# Patient Record
Sex: FEMALE | Race: WHITE | NOT HISPANIC OR LATINO | Employment: OTHER | URBAN - METROPOLITAN AREA
[De-identification: names, ages, dates, MRNs, and addresses within clinical notes are randomized per-mention and may not be internally consistent; named-entity substitution may affect disease eponyms.]

---

## 2023-03-28 PROBLEM — E66.3 OVERWEIGHT WITH BODY MASS INDEX (BMI) OF 29 TO 29.9 IN ADULT: Status: ACTIVE | Noted: 2023-03-28

## 2023-03-28 PROBLEM — M54.30 SCIATICA: Status: ACTIVE | Noted: 2023-03-28

## 2023-03-28 PROBLEM — G89.29 BACK PAIN, CHRONIC: Status: ACTIVE | Noted: 2023-03-28

## 2023-03-28 PROBLEM — I10 HYPERTENSION: Status: ACTIVE | Noted: 2023-03-28

## 2023-03-28 PROBLEM — Z96.642 STATUS POST LEFT HIP REPLACEMENT: Status: ACTIVE | Noted: 2023-03-28

## 2023-03-28 PROBLEM — E03.9 HYPOTHYROIDISM: Status: ACTIVE | Noted: 2023-03-28

## 2023-03-28 PROBLEM — E78.5 DYSLIPIDEMIA: Status: ACTIVE | Noted: 2023-03-28

## 2023-03-28 PROBLEM — R29.898 WEAKNESS OF RIGHT LOWER EXTREMITY: Status: ACTIVE | Noted: 2023-03-28

## 2023-03-28 PROBLEM — M81.0 OSTEOPOROSIS: Status: ACTIVE | Noted: 2023-03-28

## 2023-03-28 PROBLEM — R29.898 TRANSIENT LEFT LEG WEAKNESS: Status: ACTIVE | Noted: 2023-03-28

## 2023-03-28 PROBLEM — R42 VERTIGO: Status: ACTIVE | Noted: 2023-03-28

## 2023-03-28 PROBLEM — N39.0 URINARY TRACT INFECTION: Status: ACTIVE | Noted: 2023-03-28

## 2023-03-28 PROBLEM — H61.22 IMPACTED CERUMEN OF LEFT EAR: Status: ACTIVE | Noted: 2023-03-28

## 2023-03-28 PROBLEM — B00.1 RECURRENT COLD SORES: Status: ACTIVE | Noted: 2023-03-28

## 2023-03-28 PROBLEM — F43.9 SITUATIONAL STRESS: Status: ACTIVE | Noted: 2023-03-28

## 2023-03-28 PROBLEM — M54.9 BACK PAIN, CHRONIC: Status: ACTIVE | Noted: 2023-03-28

## 2023-03-28 PROBLEM — M19.90 ARTHRITIS: Status: ACTIVE | Noted: 2023-03-28

## 2023-03-28 PROBLEM — M48.061 SPINAL STENOSIS, LUMBAR: Status: ACTIVE | Noted: 2023-03-28

## 2023-03-28 PROBLEM — N39.0 RECURRENT UTI: Status: ACTIVE | Noted: 2023-03-28

## 2023-03-28 PROBLEM — N95.2 ATROPHIC VAGINITIS: Status: ACTIVE | Noted: 2023-03-28

## 2023-03-28 PROBLEM — M17.9 KNEE OSTEOARTHRITIS: Status: ACTIVE | Noted: 2023-03-28

## 2023-03-28 PROBLEM — G47.00 INSOMNIA, PERSISTENT: Status: ACTIVE | Noted: 2023-03-28

## 2023-03-28 PROBLEM — D12.6 BENIGN NEOPLASM OF LARGE INTESTINE: Status: ACTIVE | Noted: 2023-03-28

## 2023-03-28 PROBLEM — K21.9 ESOPHAGEAL REFLUX: Status: ACTIVE | Noted: 2023-03-28

## 2023-03-28 PROBLEM — M11.20 CHONDROCALCINOSIS: Status: ACTIVE | Noted: 2023-03-28

## 2023-03-28 RX ORDER — LEVOTHYROXINE SODIUM 112 UG/1
1 TABLET ORAL DAILY
COMMUNITY
Start: 2013-03-07 | End: 2023-07-03 | Stop reason: SDUPTHER

## 2023-03-28 RX ORDER — ESTRADIOL 0.1 MG/G
CREAM VAGINAL
COMMUNITY
Start: 2014-04-25 | End: 2023-12-13 | Stop reason: SDUPTHER

## 2023-03-28 RX ORDER — HYDROXYZINE HYDROCHLORIDE 10 MG/1
10-20 TABLET, FILM COATED ORAL DAILY PRN
COMMUNITY
Start: 2022-03-24 | End: 2023-03-29 | Stop reason: ALTCHOICE

## 2023-03-28 RX ORDER — LOSARTAN POTASSIUM AND HYDROCHLOROTHIAZIDE 12.5; 1 MG/1; MG/1
1 TABLET ORAL DAILY
COMMUNITY
Start: 2013-01-17 | End: 2023-04-14

## 2023-03-28 RX ORDER — AMLODIPINE BESYLATE 5 MG/1
1 TABLET ORAL DAILY
COMMUNITY
Start: 2020-12-28 | End: 2023-04-03

## 2023-03-28 RX ORDER — TIZANIDINE 4 MG/1
1 TABLET ORAL 2 TIMES DAILY
COMMUNITY
Start: 2021-11-29 | End: 2023-03-29 | Stop reason: ALTCHOICE

## 2023-03-28 RX ORDER — GABAPENTIN 300 MG/1
1 CAPSULE ORAL DAILY
COMMUNITY
Start: 2021-12-02 | End: 2023-03-29 | Stop reason: ALTCHOICE

## 2023-03-28 RX ORDER — VALACYCLOVIR HYDROCHLORIDE 1 G/1
2000 TABLET, FILM COATED ORAL
COMMUNITY
Start: 2020-02-20

## 2023-03-28 RX ORDER — OMEPRAZOLE 40 MG/1
1 CAPSULE, DELAYED RELEASE ORAL DAILY
COMMUNITY
Start: 2014-04-25 | End: 2024-04-03 | Stop reason: SDUPTHER

## 2023-03-28 RX ORDER — MIRABEGRON 50 MG/1
50 TABLET, EXTENDED RELEASE ORAL DAILY
COMMUNITY
Start: 2019-02-20 | End: 2023-04-19

## 2023-03-28 RX ORDER — TRAMADOL HYDROCHLORIDE 50 MG/1
1 TABLET ORAL DAILY PRN
COMMUNITY
Start: 2021-12-07 | End: 2023-03-29 | Stop reason: ALTCHOICE

## 2023-03-28 RX ORDER — ASPIRIN 81 MG/1
81 TABLET ORAL 3 TIMES WEEKLY
COMMUNITY
Start: 2020-12-28 | End: 2023-03-29 | Stop reason: ALTCHOICE

## 2023-03-28 NOTE — PROGRESS NOTES
Subjective   Patient ID: Jennifer Hdz is a 82 y.o. female who presents for evaluation of bilateral otalgia     Her younger sister was diagnosed with mouth cancer. She was a smoker, quit 10 years ago    She complains of her right ear feeling like there is water in it.   Her left ear has pain in it when pressure is applied.     She is having memory issues.   She has made plans with friends but forgets about the plans   She has not been driving and forgetting where she is going  She has no issues with leaving things running around the house   She admits she has remodeling projects going on at her house and has been distracted     She is not taking Tramadol any longer     She is no longer having constant pain in her hip  She has good surgical results   She is able to do stairs again     She saw Dr Rodriguez for back pain   She had a CT scan of L/S and nothing was found 2022  She had a bursa injection and it did not help 2022  She saw a different ortho physician and had injection to the bursa 12/2022 and doing so much better. She is able to bend to pick things off of the floor.   he was using long tongs to pick things up from the floor   She is back to walking 4 miles daily       HEALTH  PAP 7/05 and last one   Mammo 4/14 , 5/15 , 8/16, 12/18 at , 12/19 at , 9/1/2020-, 6/2022  BD 1/14 and T-2.7 and +2.4,1/17 T-2.2 and +2.2, 1/2020 T-1.8 hip, 6/2022 T-1.3  FRAX 1/2020 is 3.6%  Reclast 2012 last time   Colon + polyps and 2006, 5/2011, 12/17 +polyp so Q 1, ordered 5/2022   EKG 12/13 , 6/15 [facility] , 8/17 at , 12/18, 10/2020, 3/2021   Urine 12/14, 12/15 , 12/17, 6/18, 12/18, 12/19 at    Flu 2016 , 2017, 9/18, 12/19 , 10/2020, 9/2021, 11/2022  TDAP ? and will update with injury   Pneumovax 2012 last one   Prevnar 12/15, 1/16  Zostavax 2007  Shingrix 5/1/19 and 2/7/19   Moderna CVD vaccine 2/18/2021 and 3/11/2021 booster 12/4/2021  Ophth- She is seeing someone at Dr Andres's office, she is due for exam 2018. She goes  Q2 years for Rx check.        Review of Systems  All systems negative except those listed in the HPI      Objective   Visit Vitals  /78   Pulse 66   Temp 36.2 °C (97.2 °F)   Resp 16    Body mass index is 29.74 kg/m².     Physical Exam  Vitals reviewed.   Constitutional:       Appearance: Normal appearance.   HENT:      Head: Normocephalic.      Right Ear: Tympanic membrane, ear canal and external ear normal.      Left Ear: Tympanic membrane, ear canal and external ear normal.      Nose: Nose normal.      Mouth/Throat:      Pharynx: Oropharynx is clear.   Eyes:      Conjunctiva/sclera: Conjunctivae normal.   Cardiovascular:      Rate and Rhythm: Normal rate and regular rhythm.      Pulses: Normal pulses.      Heart sounds: Normal heart sounds.   Pulmonary:      Effort: Pulmonary effort is normal.      Breath sounds: Normal breath sounds.   Abdominal:      General: Bowel sounds are normal.      Palpations: Abdomen is soft.   Musculoskeletal:         General: Normal range of motion.      Cervical back: Normal range of motion.      Comments: TMJ tenderness left side   Skin:     General: Skin is warm.   Neurological:      General: No focal deficit present.      Mental Status: She is alert and oriented to person, place, and time.   Psychiatric:         Mood and Affect: Mood normal.         Behavior: Behavior normal.         Thought Content: Thought content normal.         Judgment: Judgment normal.       Assessment/Plan   Problem List Items Addressed This Visit       Dyslipidemia    Hypertension - Primary    Hypothyroidism    Osteoporosis    Situational stress    Status post left hip replacement        Follow up completed     She is an overall healthy 83 y/o female. Her younger sister was diagnosed with mouth cancer. She was a smoker, quit 10 years ago    Left sided TMJ: On exam: nothing noted in the ears bilaterally. She has TMJ tenderness left side 3/2023. Explained the pressure may be from the Eustachian tubes  not draining well. She can take an Aleve or Motrin prn pain   She complains of her right ear feeling like there is water in it.   Her left ear has pain in it when pressure is applied.   She follows with her dentist routinely      She has experienced tinnitus for years.  This is not problematic for her  She went to [facility] for hearing aids bilaterally    Her weight in office today is 146 with BMI of 29.74. We spent 15 minutes discussing diet and weight loss. The struggle of weight loss persists     HTN: Stable   Continue losartan/HCTZ 100/12.5 mg daily and amlodipine 5 mg daily.  EKG was normal except few PVC, no LVH or strain pattern noted 3/2021  Stress test 2017 was normal   She will continue to monitor her BP at home and will call with elevated readings     The patient's 10 yr CV risk was estimated at 38 %. We can decrease this to 18% with tighter control of her lipids 5/2022. I do not want to add a statin medication at this time     Elevated lipids:  and HDL 62 on labs in 6/2022  Explained goal for LDL to be less than 100 and ideally less than 70   Discussed diet and exercise for better control     Hypothyroid: TSH 0.99 on labs in 6/2022   Stable. Continue levothyroxine 112 mcg a day   She saw Endo but there were no recommendations that were new     Concerns for memory issues: Reassurance given to patient today 3/2023. I have not seen a decline in her memory. Explained when to be concerned for memory issues. She admits she has remodeling projects going on at her house and has been distracted   She is having memory issues. Explained being more mindful   She has made plans with friends but forgets about the plans   She has not been driving and forgetting where she is going  She has no issues with leaving things running around the house   She is keeping activities logged on a calendar and keeps a notebook close by   She reads a lot and does puzzle books.  She can try OTC memory supplements if she would like.      Situational Depression/ insomnia:   She is no longer seeing a therapist   Her  passed in 2022. She would have been  37 years in July 2022   Her stress got to a level that she disassociated when her  passed   Her son volunteered to do her finances for her and he is a math wizard so she let him.   Her KIRAN passed away from ALS in 8/18   She exercises to help relieve her stress.   She admits weight gain exacerbates her depressive Sx.   She likes to be in control of her environment   She could not tolerate Lexapro   Discontinue Remeron and Klonopin since it did not help.    Recurrent leg cramps:  She started eating potassium enriched foods and this has helped  She has good relief with yellow mustard prn    Bilateral varicose veins and spider veins right> left   Warned patient of S&S of phlebitis and she will call immediately if this is noted     Recurrent UTI- UTI 9/20/2021, 10/4/2021 and 10/12/2021.   Continue Macrobid 50 mg daily and D- Mannose daily   Continue Pyridium to use prn UTI.  Continue Estrace cream QOD and Myrbetriq 50 mg daily for OAB   Prescription sent in for Bactrim to have on hand and use prn UTI   CT urogram and cystoscopy in the past with Dr Mckenzie  She saw Dr Bush on 5/29/2020    Back pain:   She had nerve blocks on 1/21/2022 with Dr Espinoza.   She has tried many different modalities to control pain and they have all failed   MRI L/S 12/2021 showed severe spinal stenosis lower spine, worse on left side.   MRI of C/S 2/2022 showed multilevel degenerative changes of the cervical spine. Central canal stenosis is most pronounced at C5- 6. There is multilevel neuroforaminal narrowing due to facet and uncovertebral joint hypertrophy  Continue gabapentin 300 mg 1 tablet QHS and tizanidine prn.   She had back surgery on 3 vertebrae (L3,4, and L5) with Dr Rodriguez 4/21/2022.   She had a CT scan of L/S and nothing was found 2022  She had a bursa injection and it did not help 2022  She saw  a different ortho physician and had injection to the bursa 12/2022 and doing so much better.   She is no longer taking Tramadol     Left hip pain and Arthritis issues: S/p LTH anterior approach 10/5/2022. She is happy with her surgical outcome   She saw ortho 7/2022 and had a nerve block but it did not help   She is doing well after surgery and will continue following with ortho     Right shoulder complete reversal on 10/5/2020:   She saw Dr Meza for a second opinion and was told she fractured her acromion   She was told there is nothing more that can be done for her right shoulder   She completed PT and doing the exercises at home     Recurrent Cold sores:  She has cold sores because of stress. She states she has a history of cold sores.  She is using L-lysine and Herpecin for cold sores  Continue Valtrex 1 GM to take as directed with onset of cold sore     Vitamin D def: Levels 36 on labs in 6/2022  Recommend OTC Vitamin D 2000 UT daily     Dermatology:   She had BCC removed from her forehead on 2/11/2022  She had BCC removed on 2/18/2022    Pap no longer needed   Mammo at [facility] + gneet in 6/2022 was negative. Breast exam normal 5/2022     BD 6/2022 T-1.3. Continue OTC Ca 600 mg BID, OTC Vitamin D 2000 UT daily and eat 2 servings of calcium enriched foods daily. Discussed importance of weight bearing exercises.  FRAX score was only 14.   Reclast last injection in 12/17.    Colonoscopy and EGD with Dr Galdamez and the EGD had polyp and bx done and the colon + polyp   Colon orders placed 5/2022     Ophth:  She is seeing someone at Dr Andres's office, she was seen in 2019.   She goes Q 2 years for Rx check.   She will have her next eye exam results faxed to my office in order to update her medical records.     Her daughter Lizeth is her MPOA. Recommend she bring a copy of her LW and MPOA in office to have scanned in her chart 5/2022      Flu 2016, 2017, 9/18, 12/19 , 10/2020, 9/2021, 11/2022  TDAP ? and will update  with injury   Pneumovax 2012 last one   Prevnar 12/15, 1/16  Zostavax 2007  Shingrix 5/1/19 and 2/7/19   Moderna CVD vaccine 2/18/2021 and 3/11/2021 booster 12/4/2021    RTC in 5/2023 for MCR or sooner if needed      Scribe Attestation  By signing my name below, I, Amy Greer , Scribe   attest that this documentation has been prepared under the direction and in the presence of Darlene Azul MD.     0

## 2023-03-29 ENCOUNTER — OFFICE VISIT (OUTPATIENT)
Dept: PRIMARY CARE | Facility: CLINIC | Age: 83
End: 2023-03-29
Payer: MEDICARE

## 2023-03-29 VITALS
RESPIRATION RATE: 16 BRPM | SYSTOLIC BLOOD PRESSURE: 132 MMHG | OXYGEN SATURATION: 96 % | TEMPERATURE: 97.2 F | WEIGHT: 146 LBS | HEART RATE: 66 BPM | BODY MASS INDEX: 29.74 KG/M2 | DIASTOLIC BLOOD PRESSURE: 78 MMHG

## 2023-03-29 DIAGNOSIS — Z96.642 STATUS POST LEFT HIP REPLACEMENT: ICD-10-CM

## 2023-03-29 DIAGNOSIS — M81.0 AGE-RELATED OSTEOPOROSIS WITHOUT CURRENT PATHOLOGICAL FRACTURE: ICD-10-CM

## 2023-03-29 DIAGNOSIS — E78.5 DYSLIPIDEMIA: ICD-10-CM

## 2023-03-29 DIAGNOSIS — I10 PRIMARY HYPERTENSION: ICD-10-CM

## 2023-03-29 DIAGNOSIS — F43.9 SITUATIONAL STRESS: ICD-10-CM

## 2023-03-29 DIAGNOSIS — R41.3 MEMORY LOSS, SHORT TERM: Primary | ICD-10-CM

## 2023-03-29 DIAGNOSIS — E03.9 HYPOTHYROIDISM, UNSPECIFIED TYPE: ICD-10-CM

## 2023-03-29 DIAGNOSIS — S03.00XS TMJ (DISLOCATION OF TEMPOROMANDIBULAR JOINT), SEQUELA: ICD-10-CM

## 2023-03-29 PROBLEM — M54.9 BACK PAIN, CHRONIC: Status: RESOLVED | Noted: 2023-03-28 | Resolved: 2023-03-29

## 2023-03-29 PROBLEM — R29.898 WEAKNESS OF RIGHT LOWER EXTREMITY: Status: RESOLVED | Noted: 2023-03-28 | Resolved: 2023-03-29

## 2023-03-29 PROBLEM — G89.29 BACK PAIN, CHRONIC: Status: RESOLVED | Noted: 2023-03-28 | Resolved: 2023-03-29

## 2023-03-29 PROBLEM — R29.898 TRANSIENT LEFT LEG WEAKNESS: Status: RESOLVED | Noted: 2023-03-28 | Resolved: 2023-03-29

## 2023-03-29 PROBLEM — M54.30 SCIATICA: Status: RESOLVED | Noted: 2023-03-28 | Resolved: 2023-03-29

## 2023-03-29 PROCEDURE — 1159F MED LIST DOCD IN RCRD: CPT | Performed by: INTERNAL MEDICINE

## 2023-03-29 PROCEDURE — 99214 OFFICE O/P EST MOD 30 MIN: CPT | Performed by: INTERNAL MEDICINE

## 2023-03-29 PROCEDURE — 1036F TOBACCO NON-USER: CPT | Performed by: INTERNAL MEDICINE

## 2023-03-29 PROCEDURE — 1160F RVW MEDS BY RX/DR IN RCRD: CPT | Performed by: INTERNAL MEDICINE

## 2023-03-29 PROCEDURE — 3075F SYST BP GE 130 - 139MM HG: CPT | Performed by: INTERNAL MEDICINE

## 2023-03-29 PROCEDURE — 3078F DIAST BP <80 MM HG: CPT | Performed by: INTERNAL MEDICINE

## 2023-03-29 ASSESSMENT — PATIENT HEALTH QUESTIONNAIRE - PHQ9
1. LITTLE INTEREST OR PLEASURE IN DOING THINGS: NOT AT ALL
SUM OF ALL RESPONSES TO PHQ9 QUESTIONS 1 AND 2: 0
2. FEELING DOWN, DEPRESSED OR HOPELESS: NOT AT ALL

## 2023-03-29 ASSESSMENT — LIFESTYLE VARIABLES: HOW OFTEN DO YOU HAVE A DRINK CONTAINING ALCOHOL: 2-3 TIMES A WEEK

## 2023-04-01 DIAGNOSIS — I10 PRIMARY HYPERTENSION: Primary | ICD-10-CM

## 2023-04-03 RX ORDER — AMLODIPINE BESYLATE 5 MG/1
TABLET ORAL
Qty: 90 TABLET | Refills: 0 | Status: SHIPPED | OUTPATIENT
Start: 2023-04-03 | End: 2023-06-28

## 2023-04-14 DIAGNOSIS — I10 PRIMARY HYPERTENSION: Primary | ICD-10-CM

## 2023-04-14 RX ORDER — LOSARTAN POTASSIUM AND HYDROCHLOROTHIAZIDE 12.5; 1 MG/1; MG/1
TABLET ORAL
Qty: 90 TABLET | Refills: 0 | Status: SHIPPED | OUTPATIENT
Start: 2023-04-14 | End: 2023-07-13

## 2023-04-19 DIAGNOSIS — N39.0 RECURRENT UTI: Primary | ICD-10-CM

## 2023-04-19 RX ORDER — NITROFURANTOIN MACROCRYSTALS 50 MG/1
50 CAPSULE ORAL NIGHTLY
COMMUNITY
Start: 2022-09-27 | End: 2023-04-19 | Stop reason: SDUPTHER

## 2023-04-19 RX ORDER — NITROFURANTOIN MACROCRYSTALS 50 MG/1
50 CAPSULE ORAL NIGHTLY
Qty: 90 CAPSULE | Refills: 0 | Status: SHIPPED | OUTPATIENT
Start: 2023-04-19 | End: 2023-04-25 | Stop reason: ALTCHOICE

## 2023-04-19 RX ORDER — MIRABEGRON 50 MG/1
TABLET, FILM COATED, EXTENDED RELEASE ORAL
Qty: 90 TABLET | Refills: 0 | Status: SHIPPED | OUTPATIENT
Start: 2023-04-19 | End: 2023-07-24 | Stop reason: SDUPTHER

## 2023-04-21 ENCOUNTER — OFFICE VISIT (OUTPATIENT)
Dept: PRIMARY CARE | Facility: CLINIC | Age: 83
End: 2023-04-21
Payer: MEDICARE

## 2023-04-21 VITALS
BODY MASS INDEX: 30.15 KG/M2 | SYSTOLIC BLOOD PRESSURE: 128 MMHG | WEIGHT: 148 LBS | TEMPERATURE: 97.8 F | RESPIRATION RATE: 16 BRPM | DIASTOLIC BLOOD PRESSURE: 72 MMHG | OXYGEN SATURATION: 90 % | HEART RATE: 75 BPM

## 2023-04-21 DIAGNOSIS — R09.02 HYPOXIA: Primary | ICD-10-CM

## 2023-04-21 PROCEDURE — 3074F SYST BP LT 130 MM HG: CPT | Performed by: NURSE PRACTITIONER

## 2023-04-21 PROCEDURE — 3078F DIAST BP <80 MM HG: CPT | Performed by: NURSE PRACTITIONER

## 2023-04-21 PROCEDURE — 99214 OFFICE O/P EST MOD 30 MIN: CPT | Performed by: NURSE PRACTITIONER

## 2023-04-21 PROCEDURE — 1159F MED LIST DOCD IN RCRD: CPT | Performed by: NURSE PRACTITIONER

## 2023-04-21 PROCEDURE — 1160F RVW MEDS BY RX/DR IN RCRD: CPT | Performed by: NURSE PRACTITIONER

## 2023-04-21 PROCEDURE — 1036F TOBACCO NON-USER: CPT | Performed by: NURSE PRACTITIONER

## 2023-04-21 RX ORDER — NEBULIZER AND COMPRESSOR
1 EACH MISCELLANEOUS EVERY 6 HOURS PRN
Qty: 1 EACH | Refills: 0 | Status: SHIPPED | OUTPATIENT
Start: 2023-04-21 | End: 2023-05-21

## 2023-04-21 RX ORDER — ALBUTEROL SULFATE 0.83 MG/ML
2.5 SOLUTION RESPIRATORY (INHALATION) 4 TIMES DAILY PRN
Qty: 120 ML | Refills: 0 | Status: SHIPPED | OUTPATIENT
Start: 2023-04-21 | End: 2023-11-08 | Stop reason: SDUPTHER

## 2023-04-21 ASSESSMENT — ENCOUNTER SYMPTOMS
DIARRHEA: 0
NAUSEA: 0
FATIGUE: 1
COUGH: 1
WHEEZING: 1
RHINORRHEA: 0
CHILLS: 1
VOMITING: 0
SHORTNESS OF BREATH: 1
FEVER: 1
ABDOMINAL PAIN: 0

## 2023-04-21 NOTE — PROGRESS NOTES
Subjective   Patient ID: Jennifer Hdz is a 82 y.o. female who presents for Cough.    Patient has had a cough for the past week. It is getting worse. Patient had a fever of 101 today. Took aspirin and it helped with the fever. Patient says that her chest hurts. Patient is vaccinated against COVID with a booster (not the most recent). Patient says that she has never felt this sick. Pt did an at home COVID test and it was negative.     Review of Systems   Constitutional:  Positive for chills, fatigue and fever.   HENT:  Negative for rhinorrhea.    Respiratory:  Positive for cough, shortness of breath and wheezing.    Cardiovascular:  Positive for chest pain.   Gastrointestinal:  Negative for abdominal pain, diarrhea, nausea and vomiting.       Objective   /72 (BP Location: Left arm, Patient Position: Sitting, BP Cuff Size: Adult)   Pulse 75   Temp 36.6 °C (97.8 °F) (Temporal)   Resp 16   Wt 67.1 kg (148 lb)   BMI 30.15 kg/m²     Physical Exam  Vitals reviewed.   Constitutional:       General: She is not in acute distress.     Appearance: She is ill-appearing.   HENT:      Head: Atraumatic.   Eyes:      Conjunctiva/sclera: Conjunctivae normal.   Cardiovascular:      Rate and Rhythm: Normal rate and regular rhythm.      Heart sounds: Normal heart sounds. No murmur heard.  Pulmonary:      Effort: Pulmonary effort is normal.      Breath sounds: Wheezing present.      Comments: Pt with inspiratory and expiratory wheezing. Deep moist cough. Hurts patient to cough. No signs of respiratory distress.   Chest:      Chest wall: Tenderness present.   Skin:     General: Skin is warm and dry.   Neurological:      General: No focal deficit present.      Mental Status: She is alert.   Psychiatric:         Mood and Affect: Mood normal.         Behavior: Behavior normal.     Assessment/Plan   Problem List Items Addressed This Visit    None  Visit Diagnoses       Hypoxia    -  Primary    Relevant Medications     nebulizer and compressor device        Patient with hypoxia in the office. Her pulse ox is between 90-92%. Pt with a fever and chest discomfort with cough. There is a concern for pneumonia. Patient is here with her daughter Lizeth and they were advised that Jennifer Mckinney is to go to the ER at [facility] for further evaluation and STAT imaging. Will give patient a nebulizer machine to take home to help with cough/breathing after discharge. Patient and daughter declined the need for ambulance to transport her to ER. Called Zee in the ER at [facility] and gave report.     Dr. Freed aware and in agreement with plan of care to send patient to the ER.

## 2023-04-24 ENCOUNTER — DOCUMENTATION (OUTPATIENT)
Dept: CARE COORDINATION | Facility: CLINIC | Age: 83
End: 2023-04-24
Payer: MEDICARE

## 2023-04-24 NOTE — PROGRESS NOTES
Subjective   Patient ID: Jennifer Hdz is a 82 y.o. female who presents for Transition of care and hospital follow up, admitted on 4/21/2023 and discharged on 4/22/2023 and diagnosed with acquired pneumonia       The patient did not get sick until she returned from seeing her sister  Her sister was not sick and no sick contacts she is aware of   She thought this was her ending she was feeling so poorly   Her nose is very sore since the hospital stay   She still has hoarseness to her voice   She was able to eat yesterday. She is forcing herself to eat   Today she noticed she was able to ambulate faster without SOB   The nebulizer treatments make her feel shaky afterwards   She has been afebrile since being home   She went to an  prior to going to the hospital   She is still taking Abx       HEALTH  PAP 7/05 and last one   Mammo 4/14 , 5/15 , 8/16, 12/18 at , 12/19 at , 9/1/2020-, 6/2022  BD 1/14 and T-2.7 and +2.4,1/17 T-2.2 and +2.2, 1/2020 T-1.8 hip, 6/2022 T-1.3  FRAX 1/2020 is 3.6%  Reclast 2012 last time   Colon + polyps and 2006, 5/2011, 12/17 +polyp so Q 1, ordered 5/2022   EKG 12/13 , 6/15 [facility] , 8/17 at , 12/18, 10/2020, 3/2021, 4/2023 ED  Urine 12/14, 12/15 , 12/17, 6/18, 12/18, 12/19 at    Flu 2016 , 2017, 9/18, 12/19 , 10/2020, 9/2021, 11/2022  TDAP ? and will update with injury   Pneumovax 2012 last one   Prevnar 12/15, 1/16  Zostavax 2007  Shingrix 5/1/19 and 2/7/19   Moderna CVD vaccine 2/18/2021 and 3/11/2021 booster 12/4/2021  Ophth- She is seeing someone at Dr Andres's office, she is due for exam 2018. She goes Q2 years for Rx check.        Review of Systems  All systems negative except those listed in the HPI      Objective   Visit Vitals  /66   Pulse 71   Temp 36.4 °C (97.6 °F)   Resp 16    Body mass index is 30.15 kg/m².     Engagement  Call Start Time: 1105 (4/25/2023 11:18 AM)    Medications  Medications reviewed with patient/caregiver?: Yes (4/25/2023 11:18 AM)  Is the  patient having any side effects they believe may be caused by any medication additions or changes?: No (4/25/2023 11:18 AM)  Does the patient have all medications ordered at discharge?: Yes (4/25/2023 11:18 AM)  Care Management Interventions: No intervention needed (4/25/2023 11:18 AM)  Is the patient taking all medications as directed (includes completed medication regime)?: Yes (4/25/2023 11:18 AM)    Appointments  Does the patient have a primary care provider?: Yes (4/25/2023 11:18 AM)  Care Management Interventions: Verified appointment date/time/provider (4/25/2023 11:18 AM)  Has the patient kept scheduled appointments due by today?: Yes (4/25/2023 11:18 AM)    Self Management  What is the home health agency?: None Ordered (4/25/2023 11:18 AM)  What Durable Medical Equipment (DME) was ordered?: None ordered (4/25/2023 11:18 AM)    Patient Teaching  Does the patient have access to their discharge instructions?: Yes (4/25/2023 11:18 AM)  Care Management Interventions: Reviewed instructions with patient (4/25/2023 11:18 AM)  What is the patient's perception of their health status since discharge?: Improving (4/25/2023 11:18 AM)  Is the patient/caregiver able to teach back the hierarchy of who to call/visit for symptoms/problems? PCP, Specialist, Home Health nurse, Urgent Care, ED, 911: Yes (4/25/2023 11:18 AM)       Physical Exam  Vitals reviewed.   Constitutional:       Appearance: Normal appearance.   HENT:      Head: Normocephalic.      Right Ear: Tympanic membrane, ear canal and external ear normal.      Left Ear: Tympanic membrane, ear canal and external ear normal.      Nose:      Comments: right nares dry     Mouth/Throat:      Pharynx: Oropharynx is clear.   Eyes:      Conjunctiva/sclera: Conjunctivae normal.   Cardiovascular:      Rate and Rhythm: Normal rate and regular rhythm.      Pulses: Normal pulses.      Heart sounds: Normal heart sounds.   Pulmonary:      Comments: crackles and rhonchi in the  lower lobes bilaterally   Abdominal:      General: Bowel sounds are normal.      Palpations: Abdomen is soft.   Musculoskeletal:         General: Normal range of motion.      Cervical back: Normal range of motion.   Skin:     General: Skin is warm.   Neurological:      General: No focal deficit present.      Mental Status: She is alert and oriented to person, place, and time.   Psychiatric:         Mood and Affect: Mood normal.         Behavior: Behavior normal.         Thought Content: Thought content normal.         Judgment: Judgment normal.       Assessment/Plan           Transition of care and hospital follow up completed   Hospital notes reviewed and medication reconciliation performed with patient   Reviewed labs and imaging from the hospital 4/2023    Follow up completed   Labs ordered and she will have these drawn next week     The patient did not get sick until she returned from seeing her sister  Her sister was not sick and no sick contacts she is aware of   She thought this was her ending she was feeling so poorly   Her nose is very sore since the hospital stay   She still has hoarseness to her voice   She was able to eat yesterday. She is forcing herself to eat   - Recommend she eat smaller meals more frequently during the day   Today she noticed she was able to ambulate faster without SOB   The nebulizer treatments make her feel shaky afterwards   She has been afebrile since being home   - Recommend she drink warm/ tepid fluids and keep well hydrated   - She will complete the Abx she is currently taking   - Recommend using a humidifier in the home  - She can continue to walk around the house for exercise.   - No lifting, biking, running or using a treadmill.   - Recommend 6 weeks before returning to full activity. Explained importance of giving herself time to heal   - Recommend eating potassium enriched foods daily - food options discussed   - Avoid dairy      Admitted on 4/21/2023 and discharged on  4/22/2023 and diagnosed with acquired pneumonia bilaterally: On exam: right nares dry, she has crackles and rhonchi in the lower lobes 4/2023  Seen in the ED on 4/21/2023 for SOB.   Patient reports she has had fevers at home as well as progressively worsening shortness of breath and cough.  She has been coughing so much to the point where she has had episodes of posttussive emesis.   Interpretation of labs: CBC with a mildly elevated white count of 11.7. Hemoglobin 11.5.  Otherwise unremarkable.  D-dimer elevated to 1743.   CMP notable for a sodium of 128, potassium of 3.2 and a chloride of 94.   Troponin initially elevated at 21 with repeat at 17.  BNP normal at 107.   CT chest 4/2023 with evidence of consolidation concerning for bilateral pneumonia.    EKG 4/2023 SR rate of 87, left axis, HI interval 150, QTc 450. Frequent PACs.   Patient was started on antibiotics : Rocephin and azithromycin.   During her stay in the ER, patient began to desaturate below 90%, requiring 2 L   of oxygen via nasal cannula.    Patient does not have any oxygen requirement at home.   Prescription sent in for mupirocin cream to apply to the nares as directed   Recommend doing nebulizer treatment Q 6- 8 hours  She has to take deep breaths Q hour. She will stand and force her diaphragm to help her take a deep breath   Recommend and orders placed for CXR to have done in 4 weeks 4/2023  She will call if Sx persist or worsen     She has experienced tinnitus for years.  This is not problematic for her  She went to [facility] for hearing aids bilaterally     Her weight in office today is 148 with BMI of 30.15. We spent 15 minutes discussing diet and weight loss. The struggle of weight loss persists     HTN: Stable   Continue losartan/HCTZ 100/12.5 mg daily and amlodipine 5 mg daily.  EKG was normal except few PVC, no LVH or strain pattern noted 3/2021  Stress test 2017 was normal   She will continue to monitor her BP at home and will call with  elevated readings      The patient's 10 yr CV risk was estimated at 38 %. We can decrease this to 18% with tighter control of her lipids 5/2022. I do not want to add a statin medication at this time      Elevated lipids:  and HDL 62 on labs in 6/2022  Explained goal for LDL to be less than 100 and ideally less than 70   Discussed diet and exercise for better control      Hypothyroid: TSH 0.99 on labs in 6/2022   Stable. Continue levothyroxine 112 mcg a day   She saw Endo but there were no recommendations that were new     Left sided TMJ:   She follows with her dentist routinely      Concerns for memory issues:   She is having memory issues. Explained being more mindful   She has made plans with friends but forgets about the plans   She has not been driving and forgetting where she is going  She has no issues with leaving things running around the house   She is keeping activities logged on a calendar and keeps a notebook close by   She reads a lot and does puzzle books.  She can try OTC memory supplements if she would like.      Situational Depression/ insomnia:   She is no longer seeing a therapist   Her  passed in 2022. She would have been  37 years in July 2022   Her stress got to a level that she disassociated when her  passed   Her son is doing her finances for her   Her KIRAN passed away from [facility] in 8/18   She exercises to help relieve her stress.   She admits weight gain exacerbates her depressive Sx.   She likes to be in control of her environment   She could not tolerate Lexapro   Discontinue Remeron and Klonopin since it did not help.     Recurrent leg cramps:  She started eating potassium enriched foods and this has helped  She has good relief with yellow mustard prn     Bilateral varicose veins and spider veins right> left   Warned patient of S&S of phlebitis and she will call immediately if this is noted      Recurrent UTI- UTI 9/20/2021, 10/4/2021 and 10/12/2021.   Continue  Macrobid 50 mg daily and D- Mannose daily   Continue Pyridium to use prn UTI.  Continue Estrace cream QOD and Myrbetriq 50 mg daily for OAB   Prescription sent in for Bactrim to have on hand and use prn UTI   CT urogram and cystoscopy in the past with Dr Mckenzie  She saw Dr Bush on 5/29/2020     Back pain:   She had nerve blocks on 1/21/2022 with Dr Espinoza.   She has tried many different modalities to control pain and they have all failed   MRI L/S 12/2021 showed severe spinal stenosis lower spine, worse on left side.   MRI of C/S 2/2022 showed multilevel degenerative changes of the cervical spine. Central canal stenosis is most pronounced at C5- 6. There is multilevel neuroforaminal narrowing due to facet and uncovertebral joint hypertrophy  Continue gabapentin 300 mg 1 tablet QHS and tizanidine prn.   She had back surgery on 3 vertebrae (L3,4, and L5) with Dr Rodriguez 4/21/2022.   She had a CT scan of L/S and nothing was found 2022  She had a bursa injection and it did not help 2022  She saw a different ortho physician and had injection to the bursa 12/2022 and doing so much better.   She is no longer taking Tramadol      Left hip pain and Arthritis issues: S/p LTH anterior approach 10/5/2022. She is happy with her surgical outcome   She saw ortho 7/2022 and had a nerve block but it did not help   She is doing well after surgery and will continue following with ortho      Right shoulder complete reversal on 10/5/2020:   She saw Dr Meza for a second opinion and was told she fractured her acromion   She was told there is nothing more that can be done for her right shoulder   She completed PT and doing the exercises at home      Recurrent Cold sores:  She has cold sores because of stress. She states she has a history of cold sores.  She is using L-lysine and Herpecin for cold sores  Continue Valtrex 1 GM to take as directed with onset of cold sore      Vitamin D def: Levels 36 on labs in 6/2022  Recommend OTC Vitamin  D 2000 UT daily      Dermatology:   She had BCC removed from her forehead on 2/11/2022  She had BCC removed on 2/18/2022     Pap no longer needed   Mammo at [facility] + genet in 6/2022 was negative. Breast exam normal 5/2022      BD 6/2022 T-1.3. Continue OTC Ca 600 mg BID, OTC Vitamin D 2000 UT daily and eat 2 servings of calcium enriched foods daily. Discussed importance of weight bearing exercises.  FRAX score was only 14.   Reclast last injection in 12/17.     Colonoscopy and EGD with Dr Galdamez and the EGD had polyp and bx done and the colon + polyp   Colon orders placed 5/2022      Ophth:  She is seeing someone at Dr Andres's office, she was seen in 2019.   She goes Q 2 years for Rx check.   She will have her next eye exam results faxed to my office in order to update her medical records.      Her daughter Lizeth is her MPOA. Recommend she bring a copy of her LW and MPOA in office to have scanned in her chart 5/2022        Flu 2016, 2017, 9/18, 12/19 , 10/2020, 9/2021, 11/2022  TDAP ? and will update with injury   Pneumovax 2012 last one   Prevnar 12/15, 1/16  Zostavax 2007  Shingrix 5/1/19 and 2/7/19   Moderna CVD vaccine 2/18/2021 and 3/11/2021 booster 12/4/2021     Keep scheduled appt in 5/2023 for MCR or sooner if needed       Scribe Attestation  By signing my name below, I, Amy Greer , Scribe   attest that this documentation has been prepared under the direction and in the presence of Darlene Azul MD.

## 2023-04-25 ENCOUNTER — PATIENT OUTREACH (OUTPATIENT)
Dept: CARE COORDINATION | Facility: CLINIC | Age: 83
End: 2023-04-25

## 2023-04-25 ENCOUNTER — OFFICE VISIT (OUTPATIENT)
Dept: PRIMARY CARE | Facility: CLINIC | Age: 83
End: 2023-04-25
Payer: MEDICARE

## 2023-04-25 VITALS
TEMPERATURE: 97.6 F | SYSTOLIC BLOOD PRESSURE: 134 MMHG | RESPIRATION RATE: 16 BRPM | HEART RATE: 71 BPM | OXYGEN SATURATION: 95 % | BODY MASS INDEX: 30.15 KG/M2 | WEIGHT: 148 LBS | DIASTOLIC BLOOD PRESSURE: 66 MMHG

## 2023-04-25 DIAGNOSIS — J18.9 PNEUMONIA OF BOTH LOWER LOBES DUE TO INFECTIOUS ORGANISM: ICD-10-CM

## 2023-04-25 DIAGNOSIS — J34.89 NASAL SORE: Primary | ICD-10-CM

## 2023-04-25 DIAGNOSIS — J96.01 ACUTE RESPIRATORY FAILURE WITH HYPOXIA (MULTI): ICD-10-CM

## 2023-04-25 PROCEDURE — 3075F SYST BP GE 130 - 139MM HG: CPT | Performed by: INTERNAL MEDICINE

## 2023-04-25 PROCEDURE — 3078F DIAST BP <80 MM HG: CPT | Performed by: INTERNAL MEDICINE

## 2023-04-25 PROCEDURE — 99214 OFFICE O/P EST MOD 30 MIN: CPT | Performed by: INTERNAL MEDICINE

## 2023-04-25 PROCEDURE — 1036F TOBACCO NON-USER: CPT | Performed by: INTERNAL MEDICINE

## 2023-04-25 PROCEDURE — 1160F RVW MEDS BY RX/DR IN RCRD: CPT | Performed by: INTERNAL MEDICINE

## 2023-04-25 PROCEDURE — 1159F MED LIST DOCD IN RCRD: CPT | Performed by: INTERNAL MEDICINE

## 2023-04-25 RX ORDER — AMOXICILLIN AND CLAVULANATE POTASSIUM 875; 125 MG/1; MG/1
875 TABLET, FILM COATED ORAL EVERY 12 HOURS
COMMUNITY
Start: 2023-04-22 | End: 2023-04-26

## 2023-04-25 RX ORDER — MUPIROCIN 20 MG/G
OINTMENT TOPICAL 3 TIMES DAILY
Qty: 22 G | Refills: 0 | Status: SHIPPED | OUTPATIENT
Start: 2023-04-25 | End: 2023-05-05

## 2023-04-25 NOTE — PROGRESS NOTES
The patient is here today for a follow up to [facility] for pneumonia, the patient was discharged on 4/22/2023.

## 2023-04-27 ENCOUNTER — DOCUMENTATION (OUTPATIENT)
Dept: CARE COORDINATION | Facility: CLINIC | Age: 83
End: 2023-04-27
Payer: MEDICARE

## 2023-04-27 NOTE — PROGRESS NOTES
Jennifer Hdz  Program: [facility] Pneumonia Post-Discharge  MRN: [id number]  YOB: 1969    This patient had a RED on Wed, 26 Apr 2023 11:01:32 am EDT    Question(s) with flags that caused the Alert (up to last 5 values   recorded)    Question: Cough Progression     Date:     2023-04-26     Color:    red     Answers:  Red    Question: Are you coughing up anything that is green, yellow, bloody, or   smelly?     Date:     2023-04-26     Color:    red     Answers:  Yes    Question: Is your cough productive?     Date:     2023-04-26     Color:    yellow     Answers:  Yes

## 2023-04-27 NOTE — PROGRESS NOTES
This nurse called and spoke to patient due to a conversa chat escalation due to coughing up yellow sputum, pt. Was also outreached by nurse hotline as well, which patient was educated on pneumonia post hospitalization and antibiotic therapy.      Pt. Stated to [facility] RN ELVIN that she took her last antibiotic last night and she is feeling much better.  Coughing happens a little after she completes her nebulizer treatments but she is breathing more deeply and feels better after treatments.  Pt. States her eating has improved and she is happy with the follow up calls and care she has been receiving from .  Pt. Has no other needs at this time.  Conversa chats will continue.  HOWIE Veronica, RN.

## 2023-05-01 ENCOUNTER — LAB (OUTPATIENT)
Dept: LAB | Facility: LAB | Age: 83
End: 2023-05-01
Payer: MEDICARE

## 2023-05-01 DIAGNOSIS — J18.9 PNEUMONIA OF BOTH LOWER LOBES DUE TO INFECTIOUS ORGANISM: ICD-10-CM

## 2023-05-01 LAB
ALANINE AMINOTRANSFERASE (SGPT) (U/L) IN SER/PLAS: 29 U/L (ref 7–45)
ALBUMIN (G/DL) IN SER/PLAS: 3.6 G/DL (ref 3.4–5)
ALKALINE PHOSPHATASE (U/L) IN SER/PLAS: 70 U/L (ref 33–136)
ANION GAP IN SER/PLAS: 9 MMOL/L (ref 10–20)
ASPARTATE AMINOTRANSFERASE (SGOT) (U/L) IN SER/PLAS: 22 U/L (ref 9–39)
BASOPHILS (10*3/UL) IN BLOOD BY AUTOMATED COUNT: 0.06 X10E9/L (ref 0–0.1)
BASOPHILS/100 LEUKOCYTES IN BLOOD BY AUTOMATED COUNT: 0.6 % (ref 0–2)
BILIRUBIN TOTAL (MG/DL) IN SER/PLAS: 0.3 MG/DL (ref 0–1.2)
CALCIUM (MG/DL) IN SER/PLAS: 8.9 MG/DL (ref 8.6–10.3)
CARBON DIOXIDE, TOTAL (MMOL/L) IN SER/PLAS: 28 MMOL/L (ref 21–32)
CHLORIDE (MMOL/L) IN SER/PLAS: 101 MMOL/L (ref 98–107)
CREATININE (MG/DL) IN SER/PLAS: 0.99 MG/DL (ref 0.5–1.05)
EOSINOPHILS (10*3/UL) IN BLOOD BY AUTOMATED COUNT: 0.13 X10E9/L (ref 0–0.4)
EOSINOPHILS/100 LEUKOCYTES IN BLOOD BY AUTOMATED COUNT: 1.2 % (ref 0–6)
ERYTHROCYTE DISTRIBUTION WIDTH (RATIO) BY AUTOMATED COUNT: 15.9 % (ref 11.5–14.5)
ERYTHROCYTE MEAN CORPUSCULAR HEMOGLOBIN CONCENTRATION (G/DL) BY AUTOMATED: 31.9 G/DL (ref 32–36)
ERYTHROCYTE MEAN CORPUSCULAR VOLUME (FL) BY AUTOMATED COUNT: 91 FL (ref 80–100)
ERYTHROCYTES (10*6/UL) IN BLOOD BY AUTOMATED COUNT: 4 X10E12/L (ref 4–5.2)
GFR FEMALE: 57 ML/MIN/1.73M2
GLUCOSE (MG/DL) IN SER/PLAS: 96 MG/DL (ref 74–99)
HEMATOCRIT (%) IN BLOOD BY AUTOMATED COUNT: 36.4 % (ref 36–46)
HEMOGLOBIN (G/DL) IN BLOOD: 11.6 G/DL (ref 12–16)
IMMATURE GRANULOCYTES/100 LEUKOCYTES IN BLOOD BY AUTOMATED COUNT: 0.7 % (ref 0–0.9)
LEUKOCYTES (10*3/UL) IN BLOOD BY AUTOMATED COUNT: 10.9 X10E9/L (ref 4.4–11.3)
LYMPHOCYTES (10*3/UL) IN BLOOD BY AUTOMATED COUNT: 1.74 X10E9/L (ref 0.8–3)
LYMPHOCYTES/100 LEUKOCYTES IN BLOOD BY AUTOMATED COUNT: 16 % (ref 13–44)
MONOCYTES (10*3/UL) IN BLOOD BY AUTOMATED COUNT: 0.52 X10E9/L (ref 0.05–0.8)
MONOCYTES/100 LEUKOCYTES IN BLOOD BY AUTOMATED COUNT: 4.8 % (ref 2–10)
NEUTROPHILS (10*3/UL) IN BLOOD BY AUTOMATED COUNT: 8.32 X10E9/L (ref 1.6–5.5)
NEUTROPHILS/100 LEUKOCYTES IN BLOOD BY AUTOMATED COUNT: 76.7 % (ref 40–80)
NRBC (PER 100 WBCS) BY AUTOMATED COUNT: 0 /100 WBC (ref 0–0)
PLATELETS (10*3/UL) IN BLOOD AUTOMATED COUNT: 500 X10E9/L (ref 150–450)
POTASSIUM (MMOL/L) IN SER/PLAS: 4 MMOL/L (ref 3.5–5.3)
PROTEIN TOTAL: 7.5 G/DL (ref 6.4–8.2)
SODIUM (MMOL/L) IN SER/PLAS: 134 MMOL/L (ref 136–145)
UREA NITROGEN (MG/DL) IN SER/PLAS: 14 MG/DL (ref 6–23)

## 2023-05-01 PROCEDURE — 85025 COMPLETE CBC W/AUTO DIFF WBC: CPT

## 2023-05-01 PROCEDURE — 36415 COLL VENOUS BLD VENIPUNCTURE: CPT

## 2023-05-01 PROCEDURE — 80053 COMPREHEN METABOLIC PANEL: CPT

## 2023-05-02 ENCOUNTER — TELEPHONE (OUTPATIENT)
Dept: PRIMARY CARE | Facility: CLINIC | Age: 83
End: 2023-05-02
Payer: MEDICARE

## 2023-05-02 NOTE — TELEPHONE ENCOUNTER
May 9th 9:30 appointment needs to be changed  to any other time in the afternoon on that day or any other day that week, please call her. She has called 4 times and can not get to talk to anyone.

## 2023-05-08 NOTE — PROGRESS NOTES
Subjective   Reason for Visit: Jennifer Hdz is an 82 y.o. female here for her Subsequent Medicare Assessment          She is doing well. Her breathing has improved   She is walking 4 miles a day again     She is going to [place] for Memorial Day     She thinks she needs to make another appt with ortho for bursa injection   She has trouble getting on her hands and knees to scrub her floor   She has pain in her knee if she kneels on it   The pain she is having has been present since hip surgery      HEALTH  PAP 7/05 and last one   Mammo 4/14 , 5/15 , 8/16, 12/18, 12/19, 9/1/2020-, 6/2022, ordered 5/2023   BD 1/14 and T-2.7 and +2.4,1/17 T-2.2 and +2.2, 1/2020 T-1.8 hip, 6/2022 T-1.3  FRAX 1/2020 is 3.6%  Reclast 2012 last time   Colon + polyps and 2006, 5/2011, 12/17 +polyp so Q 1, ordered 5/2022   EKG 12/13 , 6/15, 8/17, 12/18, 10/2020, 3/2021, 4/2023 ED  Urine 12/14, 12/15 , 12/17, 6/18, 12/18, 12/19 at    Flu 2016 , 2017, 9/18, 12/19 , 10/2020, 9/2021, 11/2022  TDAP ? and will update with injury   Pneumovax 2012 last one   Prevnar 12/15, 1/16  Zostavax 2007  Shingrix 5/1/19 and 2/7/19   Moderna CVD 2/18/2021 and 3/11/2021 booster 12/4/2021  Ophth- She is seeing someone at Dr Andres's office, she is due for exam 2018. She goes Q2 years       Patient Care Team:  Darlene Azul MD as PCP - General  Darlene Azul MD as PCP - [employer]age PCP  Sada Taylor, GARRETT as Care Manager (Case Management)     Review of Systems  All systems negative except those listed in the HPI      Past Medical, Surgical, and Family History reviewed and updated in chart.  Reviewed all medications by prescribing practitioner or clinical pharmacist   (such as prescriptions, OTCs, herbal therapies and supplements) and documented in the medical record.    Objective   Vitals:  Visit Vitals  /76 (BP Location: Left arm, Patient Position: Sitting, BP Cuff Size: Adult)   Pulse 90   Temp 35.9 °C (96.6 °F) (Skin)    Body mass  index is 29.29 kg/m².     Physical Exam  Vitals reviewed.   Constitutional:       Appearance: Normal appearance.   HENT:      Head: Normocephalic.      Right Ear: Tympanic membrane, ear canal and external ear normal.      Left Ear: Tympanic membrane, ear canal and external ear normal.      Nose: Nose normal.      Mouth/Throat:      Pharynx: Oropharynx is clear.   Eyes:      Conjunctiva/sclera: Conjunctivae normal.   Cardiovascular:      Rate and Rhythm: Normal rate and regular rhythm.      Pulses: Normal pulses.      Heart sounds: Normal heart sounds.   Pulmonary:      Effort: Pulmonary effort is normal.      Breath sounds: Normal breath sounds.   Abdominal:      General: Bowel sounds are normal.      Palpations: Abdomen is soft.   Musculoskeletal:         General: Normal range of motion.      Cervical back: Normal range of motion and neck supple.      Comments: tenderness left bursa    Skin:     General: Skin is warm.   Neurological:      General: No focal deficit present.      Mental Status: She is alert and oriented to person, place, and time.   Psychiatric:         Mood and Affect: Mood normal.         Behavior: Behavior normal.         Thought Content: Thought content normal.         Judgment: Judgment normal.       Assessment/Plan   Problem List Items Addressed This Visit       Hypertension     Other Visit Diagnoses       Visit for screening mammogram    -  Primary    Relevant Orders    BI mammo bilateral screening tomosynthesis           Subsequent Medicare Assessment completed   Hospital notes reviewed and medication reconciliation performed with patient   Reviewed her labs from 4/2023 and 5/2023     Medicare Wellness completed  -  Discussed healthy diet and regular exercise.    -  Physical exam overall unremarkable. Immunizations reviewed and updated accordingly. Healthy lifestyle choices discussed (tobacco avoidance, appropriate alcohol use, avoidance of illicit substances).   -  Patient is wearing  seatbelt.   -  Screening lab work ordered as indicated.    -  Age appropriate screening tests reviewed with patient.        We spent 15 minutes discussing depression screen and there is nothing found that is of concern for underling depression. The PQH form was filled and the meds reviewed. No depression to report     We spent 15 minutes discussing alcohol use and there are no concerns about overuse. The 15 min was spent in going over any issues of use of alcohol. None     She has grab bars in the shower.  She has not fallen recently and no risk of falls in the house   She has good lighting around the house and functioning smoke detectors.     Admitted on 4/21/2023 and discharged on 4/22/2023 and diagnosed with acquired pneumonia bilaterally: pulmonary exam normal 5/2023   Seen in the ED on 4/21/2023 for SOB.   CBC with a mildly elevated white count of 11.7. Hemoglobin 11.5.  Otherwise unremarkable.  D-dimer elevated to 1743.   CMP notable for a sodium of 128, potassium of 3.2 and a chloride of 94.   Troponin initially elevated at 21 with repeat at 17.  BNP normal at 107.   CT chest 4/2023 with evidence of consolidation concerning for bilateral pneumonia.    EKG 4/2023 SR rate of 87, left axis, IL interval 150, QTc 450. Frequent PACs.   Patient was started on antibiotics : Rocephin and azithromycin.   Recommend and orders placed for CXR to have done in 4 weeks 4/2023  She will call if Sx persist or worsen      She has experienced tinnitus for years.  This is not problematic for her  She went [facility] aids bilaterally     Her weight in office today is 145 with BMI of 29.29. We spent 15 minutes discussing diet and weight loss. The struggle of weight loss persists  Explained goal for BMI to be 25 or less       HTN: Stable   Continue losartan/HCTZ 100/12.5 mg daily and amlodipine 5 mg daily.  EKG 4/2023 SR rate of 87, left axis, IL interval 150, QTc 450. Frequent PACs.   Stress test 2017 was normal    Continue to monitor BP at home and call with elevated readings      I have spent 15 min face to face with this patient discussing their cardiac risk and behavioral therapies of nutrition choices and exercise. We are trying to eliminate habits that are contributing to their cardiac risk.  We agreed on a plan of how they can reduce their current CV risk   The patient's 10 yr CV risk was estimated at 38 %. We can decrease this to 18% with tighter control of her lipids 5/2022. I do not want to add a statin medication at this time      Elevated lipids:  and HDL 62 on labs in 6/2022  Explained goal for LDL to be less than 100 and ideally less than 70   Discussed diet and exercise for better control      Hypothyroid: TSH 0.99 on labs in 6/2022   Stable. Continue levothyroxine 112 mcg a day   She saw Endo but there were no recommendations that were new      Left sided TMJ:   She follows with her dentist routinely      Concerns for memory issues: Explained being more mindful    She is having memory issues.   She has made plans with friends but forgets about the plans   She has not been driving and forgetting where she is going  She has no issues with leaving things running around the house   She is keeping activities logged on a calendar and keeps a notebook close by   She reads a lot and does puzzle books.  She can try OTC memory supplements if she would like.      Situational Depression/ insomnia:   She is no longer seeing a therapist   Her  passed in 2022.   She would have been  37 years in July 2022   Her stress got to a level that she disassociated when her  passed   Her son is doing her finances for her   Her KIRAN passed away from ALS in 8/18   She exercises to help relieve her stress.   She admits weight gain exacerbates her depressive Sx.   She likes to be in control of her environment   She could not tolerate Lexapro   Discontinued Remeron and Klonopin since it did not help.      Recurrent leg cramps:  She started eating potassium enriched foods and this has helped  She has good relief with yellow mustard prn     Bilateral varicose veins and spider veins right> left   Warned patient of S&S of phlebitis and she will call immediately if this is noted      Recurrent UTI- UTI 9/20/2021, 10/4/2021 and 10/12/2021.   Continue Macrobid 50 mg daily and D- Mannose daily   Continue Pyridium to use prn UTI.  Continue Estrace cream QOD and Myrbetriq 50 mg daily for OAB   CT urogram and cystoscopy in the past with Dr Mckenzie  She saw Dr Bush on 5/29/2020     Back pain:   She had nerve blocks on 1/21/2022 with Dr Espinoza.   She has tried many different modalities to control pain and they have all failed   MRI L/S 12/2021 showed severe spinal stenosis lower spine, worse on left side.   MRI of C/S 2/2022 showed multilevel degenerative changes of the cervical spine. Central canal stenosis is most pronounced at C5- 6. There is multilevel neuroforaminal narrowing due to facet and uncovertebral joint hypertrophy  Continue gabapentin 300 mg 1 tablet QHS and tizanidine prn.   She had back surgery on 3 vertebrae (L3,4, and L5) with Dr Rodriguez 4/21/2022.   She had a CT scan of L/S and nothing was found 2022  She is no longer taking Tramadol      Left hip /arthritis issues: The pain she is having has been present since hip surgery. On exam: tenderness left bursa 5/2023   She saw ortho 7/2022 and had a nerve block but it did not help   S/p LTH anterior approach 10/5/2022.   She had a bursa injection and it did not help 2022  She saw a different ortho physician and had injection to the bursa 12/2022   She is going to call ortho for possible bursa injection 5/2023      Right shoulder complete reversal on 10/5/2020:   She saw Dr Meza for a second opinion and told she fractured her acromion   She was told there is nothing more that can be done for her right shoulder   She completed PT and doing the exercises at home       Recurrent Cold sores:  She has cold sores because of stress. She states she has a history of cold sores.  She is using L-lysine and Herpecin for cold sores  Continue Valtrex 1 GM to take as directed with onset of cold sore      Vitamin D def: Levels 36 on labs in 6/2022  Recommend OTC Vitamin D 2000 UT daily      Dermatology: she has multiple senile keratosis, nothing suspicious on exam 5/2023  She had BCC removed from her forehead on 2/11/2022  She had BCC removed on 2/18/2022  Continue following with dermatology      Pap no longer needed   Mammo at [facility] + genet in 6/2022 was negative and ordered 5/2023.   Breast exam normal 5/2023     BD 6/2022 T-1.3. Continue OTC Ca 600 mg BID, OTC Vitamin D 2000 UT daily and eat 2 servings of calcium enriched foods daily. Discussed importance of weight bearing exercises. FRAX score was only 14.   Reclast last injection in 12/17.     Colonoscopy 12/17 +polyp and orders placed 5/2022 but not done      Ophth:  She is seeing someone at Dr Andres's office, she was seen in 2019.   She goes Q 2 years for Rx check.   She will have her next eye exam results faxed to my office in order to update her medical records.      I spent 15 min with the patient discussing their wishes for end of life choices.   We discussed the need for a Living Will and that wishes should be discussed with Family. The DNR status was reviewed, and we discussed the options of this and, the DNR _CC options as well.   We also went over how important it was to have these choices written down and clear for any surviving family so that their wishes are followed   The patient and I came to to following agreement : Her daughter Lizeth is her MPOA. Recommend she bring a copy of her Advanced Directives in office to have scanned in her chart 5/2023        Flu 2016, 2017, 9/18, 12/19 , 10/2020, 9/2021, 11/2022  TDAP ? and will update with injury   Pneumovax 2012 last one   Prevnar 12/15, 1/16  Zostavax 2007  Shingrix 5/1/19  and 2/7/19   Moderna CVD vaccine 2/18/2021 and 3/11/2021 booster 12/4/2021     Return in 6 months for follow up or sooner if needed    (MCR due 5/2024)      Scribe Attestation  By signing my name below, I, Amy Greer , Scribalexander   attest that this documentation has been prepared under the direction and in the presence of Darlene Azul MD.

## 2023-05-09 ENCOUNTER — OFFICE VISIT (OUTPATIENT)
Dept: PRIMARY CARE | Facility: CLINIC | Age: 83
End: 2023-05-09
Payer: MEDICARE

## 2023-05-09 ENCOUNTER — APPOINTMENT (OUTPATIENT)
Dept: PRIMARY CARE | Facility: CLINIC | Age: 83
End: 2023-05-09
Payer: MEDICARE

## 2023-05-09 VITALS
BODY MASS INDEX: 29.23 KG/M2 | SYSTOLIC BLOOD PRESSURE: 134 MMHG | HEIGHT: 59 IN | OXYGEN SATURATION: 95 % | HEART RATE: 90 BPM | TEMPERATURE: 96.6 F | WEIGHT: 145 LBS | DIASTOLIC BLOOD PRESSURE: 68 MMHG

## 2023-05-09 DIAGNOSIS — E03.9 HYPOTHYROIDISM, UNSPECIFIED TYPE: ICD-10-CM

## 2023-05-09 DIAGNOSIS — M48.061 SPINAL STENOSIS OF LUMBAR REGION WITHOUT NEUROGENIC CLAUDICATION: ICD-10-CM

## 2023-05-09 DIAGNOSIS — I10 PRIMARY HYPERTENSION: ICD-10-CM

## 2023-05-09 DIAGNOSIS — Z00.00 HEALTHCARE MAINTENANCE: Primary | ICD-10-CM

## 2023-05-09 DIAGNOSIS — M70.62 TROCHANTERIC BURSITIS OF LEFT HIP: ICD-10-CM

## 2023-05-09 DIAGNOSIS — E78.5 DYSLIPIDEMIA: ICD-10-CM

## 2023-05-09 DIAGNOSIS — E66.3 OVERWEIGHT WITH BODY MASS INDEX (BMI) OF 29 TO 29.9 IN ADULT: ICD-10-CM

## 2023-05-09 DIAGNOSIS — M19.90 ARTHRITIS: ICD-10-CM

## 2023-05-09 DIAGNOSIS — Z96.642 STATUS POST LEFT HIP REPLACEMENT: ICD-10-CM

## 2023-05-09 DIAGNOSIS — Z12.31 VISIT FOR SCREENING MAMMOGRAM: ICD-10-CM

## 2023-05-09 PROBLEM — G47.00 INSOMNIA, PERSISTENT: Status: RESOLVED | Noted: 2023-03-28 | Resolved: 2023-05-09

## 2023-05-09 PROBLEM — J96.01 ACUTE RESPIRATORY FAILURE WITH HYPOXIA (MULTI): Status: RESOLVED | Noted: 2023-04-25 | Resolved: 2023-05-09

## 2023-05-09 PROCEDURE — G0442 ANNUAL ALCOHOL SCREEN 15 MIN: HCPCS | Performed by: INTERNAL MEDICINE

## 2023-05-09 PROCEDURE — 1159F MED LIST DOCD IN RCRD: CPT | Performed by: INTERNAL MEDICINE

## 2023-05-09 PROCEDURE — 1036F TOBACCO NON-USER: CPT | Performed by: INTERNAL MEDICINE

## 2023-05-09 PROCEDURE — 1170F FXNL STATUS ASSESSED: CPT | Performed by: INTERNAL MEDICINE

## 2023-05-09 PROCEDURE — 99397 PER PM REEVAL EST PAT 65+ YR: CPT | Performed by: INTERNAL MEDICINE

## 2023-05-09 PROCEDURE — G0439 PPPS, SUBSEQ VISIT: HCPCS | Performed by: INTERNAL MEDICINE

## 2023-05-09 PROCEDURE — 3075F SYST BP GE 130 - 139MM HG: CPT | Performed by: INTERNAL MEDICINE

## 2023-05-09 PROCEDURE — G0444 DEPRESSION SCREEN ANNUAL: HCPCS | Performed by: INTERNAL MEDICINE

## 2023-05-09 PROCEDURE — 1160F RVW MEDS BY RX/DR IN RCRD: CPT | Performed by: INTERNAL MEDICINE

## 2023-05-09 PROCEDURE — 3078F DIAST BP <80 MM HG: CPT | Performed by: INTERNAL MEDICINE

## 2023-05-09 PROCEDURE — G0446 INTENS BEHAVE THER CARDIO DX: HCPCS | Performed by: INTERNAL MEDICINE

## 2023-05-09 PROCEDURE — 99214 OFFICE O/P EST MOD 30 MIN: CPT | Performed by: INTERNAL MEDICINE

## 2023-05-09 ASSESSMENT — PATIENT HEALTH QUESTIONNAIRE - PHQ9
SUM OF ALL RESPONSES TO PHQ9 QUESTIONS 1 AND 2: 0
1. LITTLE INTEREST OR PLEASURE IN DOING THINGS: NOT AT ALL
2. FEELING DOWN, DEPRESSED OR HOPELESS: NOT AT ALL

## 2023-05-09 ASSESSMENT — ACTIVITIES OF DAILY LIVING (ADL)
BATHING: INDEPENDENT
DRESSING: INDEPENDENT
GROCERY_SHOPPING: INDEPENDENT
DOING_HOUSEWORK: INDEPENDENT
TAKING_MEDICATION: INDEPENDENT
MANAGING_FINANCES: INDEPENDENT

## 2023-05-09 ASSESSMENT — ENCOUNTER SYMPTOMS
LOSS OF SENSATION IN FEET: 0
OCCASIONAL FEELINGS OF UNSTEADINESS: 0
DEPRESSION: 0

## 2023-05-15 ENCOUNTER — DOCUMENTATION (OUTPATIENT)
Dept: CARE COORDINATION | Facility: CLINIC | Age: 83
End: 2023-05-15
Payer: MEDICARE

## 2023-05-15 NOTE — PROGRESS NOTES
Jennifer Mckinney Wilder  Program: [facility] Post-Discharge  MRN: [id number]  YOB: 1969    This patient had a YELLOW on Sat, 13 May 2023 9:11:33 am EDT    Question(s) with flags that caused the Alert (up to last 5 values   recorded)    Question: Cough Progression     Date:     2023-05-13     Color:    yellow     Answers:  Yellow       Date:     2023-04-26     Color:    red     Answers:  Red    Question: Are you coughing up anything that is green, yellow, bloody, or   smelly?     Date:     2023-05-13     Color:    yellow     Answers:  No       Date:     2023-04-26     Color:    red     Answers:  Yes    Question: Is your cough productive?     Date:     2023-05-13     Color:    yellow     Answers:  Yes       Date:     2023-04-26     Color:    yellow     Answers:  Yes

## 2023-05-15 NOTE — PROGRESS NOTES
"Patient with moderate risk escalation to [facility] [facility].  Patient states she still has a cough but it is \"much better.\"  Patient states it's a productive cough.  Patient states that the phlegm is no longer yellow it's clear now.  Patient thanked  for outreach call.  Patient with no questions at this time.  NRB  "

## 2023-05-22 ENCOUNTER — DOCUMENTATION (OUTPATIENT)
Dept: CARE COORDINATION | Facility: CLINIC | Age: 83
End: 2023-05-22
Payer: MEDICARE

## 2023-05-22 NOTE — PROGRESS NOTES
CM called and spoke with patient states she is home and doing extremely well.  Pt. States sputum has been clear and she has been feeling amazing.   Pt. Unsure of the responses she states she answered that way a while ago, but currently she is doing great.  Pt. Has no questions or concerns. HOWIE Veronica, RN.        Jennifer Mckinney Wilder  Program: [facility]neumonia Post-Discharge  MRN: [id number]  YOB: 1969    This patient had a YELLOW on Sat, 20 May 2023 5:09:04 pm EDT    Question(s) with flags that caused the Alert (up to last 5 values   recorded)    Question: Cough Progression     Date:     2023-05-20     Color:    yellow     Answers:  Yellow       Date:     2023-05-13     Color:    yellow     Answers:  Yellow       Date:     2023-04-26     Color:    red     Answers:  Red    Question: Are you coughing up anything that is green, yellow, bloody, or   smelly?     Date:     2023-05-20     Color:    yellow     Answers:  No       Date:     2023-05-13     Color:    yellow     Answers:  No       Date:     2023-04-26     Color:    red     Answers:  Yes    Question: Is your cough productive?     Date:     2023-05-20     Color:    yellow     Answers:  Yes       Date:     2023-05-13     Color:    yellow     Answers:  Yes       Date:     2023-04-26     Color:    yellow     Answers:  Yes

## 2023-06-15 ENCOUNTER — TELEPHONE (OUTPATIENT)
Dept: PRIMARY CARE | Facility: CLINIC | Age: 83
End: 2023-06-15
Payer: MEDICARE

## 2023-06-15 DIAGNOSIS — N39.0 RECURRENT UTI: ICD-10-CM

## 2023-06-15 DIAGNOSIS — M48.062 SPINAL STENOSIS OF LUMBAR REGION WITH NEUROGENIC CLAUDICATION: Primary | ICD-10-CM

## 2023-06-15 RX ORDER — NITROFURANTOIN 25; 75 MG/1; MG/1
100 CAPSULE ORAL 2 TIMES DAILY
Qty: 14 CAPSULE | Refills: 1 | Status: SHIPPED | OUTPATIENT
Start: 2023-06-15 | End: 2023-06-22

## 2023-06-15 NOTE — TELEPHONE ENCOUNTER
Patient states she has another UTI. Symptoms are burning upon urination / urgency for the last couple days. States she gets uti's frequently.     Would like to know if you could order a urine test and possible antibiotic.     Uses [facility] pharmacy in [place].     Please advise

## 2023-06-28 DIAGNOSIS — I10 PRIMARY HYPERTENSION: ICD-10-CM

## 2023-06-28 RX ORDER — AMLODIPINE BESYLATE 5 MG/1
TABLET ORAL
Qty: 90 TABLET | Refills: 0 | Status: SHIPPED | OUTPATIENT
Start: 2023-06-28 | End: 2023-10-19

## 2023-07-03 DIAGNOSIS — E03.9 HYPOTHYROIDISM, UNSPECIFIED TYPE: Primary | ICD-10-CM

## 2023-07-03 RX ORDER — LEVOTHYROXINE SODIUM 112 UG/1
112 TABLET ORAL DAILY
Qty: 6 TABLET | Refills: 0 | Status: SHIPPED | OUTPATIENT
Start: 2023-07-03 | End: 2023-11-06

## 2023-07-10 ENCOUNTER — LAB (OUTPATIENT)
Dept: LAB | Facility: LAB | Age: 83
End: 2023-07-10
Payer: MEDICARE

## 2023-07-10 DIAGNOSIS — N39.0 RECURRENT UTI: ICD-10-CM

## 2023-07-10 LAB
APPEARANCE, URINE: CLEAR
BILIRUBIN, URINE: NEGATIVE
BLOOD, URINE: NEGATIVE
COLOR, URINE: YELLOW
GLUCOSE, URINE: NEGATIVE MG/DL
KETONES, URINE: NEGATIVE MG/DL
LEUKOCYTE ESTERASE, URINE: ABNORMAL
NITRITE, URINE: NEGATIVE
PH, URINE: 6 (ref 5–8)
PROTEIN, URINE: NEGATIVE MG/DL
RBC, URINE: NORMAL /HPF (ref 0–5)
SPECIFIC GRAVITY, URINE: 1.01 (ref 1–1.03)
SQUAMOUS EPITHELIAL CELLS, URINE: 1 /HPF
UROBILINOGEN, URINE: <2 MG/DL (ref 0–1.9)
WBC, URINE: 1 /HPF (ref 0–5)

## 2023-07-10 PROCEDURE — 81001 URINALYSIS AUTO W/SCOPE: CPT

## 2023-07-13 DIAGNOSIS — I10 PRIMARY HYPERTENSION: ICD-10-CM

## 2023-07-13 RX ORDER — LOSARTAN POTASSIUM AND HYDROCHLOROTHIAZIDE 12.5; 1 MG/1; MG/1
TABLET ORAL
Qty: 90 TABLET | Refills: 3 | Status: SHIPPED | OUTPATIENT
Start: 2023-07-13 | End: 2024-04-03 | Stop reason: SDUPTHER

## 2023-07-14 ENCOUNTER — TELEPHONE (OUTPATIENT)
Dept: PRIMARY CARE | Facility: CLINIC | Age: 83
End: 2023-07-14
Payer: MEDICARE

## 2023-07-14 NOTE — TELEPHONE ENCOUNTER
Patient has another UTI, incontinence, pain while urinating, burning. Patient states her symptoms started this AM. Patient has her sister's  tomorrow. Needs something asap. Please advise at home number when prescription is called in to pharmacy.

## 2023-07-15 LAB — URINE CULTURE: NORMAL

## 2023-07-19 DIAGNOSIS — N39.0 RECURRENT UTI: Primary | ICD-10-CM

## 2023-07-19 RX ORDER — NITROFURANTOIN 25; 75 MG/1; MG/1
100 CAPSULE ORAL 2 TIMES DAILY
Qty: 10 CAPSULE | Refills: 2 | Status: SHIPPED | OUTPATIENT
Start: 2023-07-19 | End: 2023-07-24 | Stop reason: SDUPTHER

## 2023-07-24 DIAGNOSIS — N39.0 RECURRENT UTI: ICD-10-CM

## 2023-07-24 PROBLEM — R09.02 HYPOXIA: Status: ACTIVE | Noted: 2023-07-24

## 2023-07-24 PROBLEM — E87.6 HYPOKALEMIA: Status: ACTIVE | Noted: 2023-07-24

## 2023-07-24 PROBLEM — M25.9 DISORDER OF SHOULDER: Status: ACTIVE | Noted: 2023-07-24

## 2023-07-24 PROBLEM — M25.559 JOINT PAIN, HIP: Status: ACTIVE | Noted: 2023-07-24

## 2023-07-24 PROBLEM — M25.519 SHOULDER JOINT PAIN: Status: ACTIVE | Noted: 2023-07-24

## 2023-07-24 PROBLEM — R25.1 OCCASIONAL TREMORS: Status: ACTIVE | Noted: 2023-07-24

## 2023-07-24 PROBLEM — H26.9 CATARACT: Status: ACTIVE | Noted: 2023-07-24

## 2023-07-24 PROBLEM — J18.9 COMMUNITY ACQUIRED PNEUMONIA OF BOTH LUNGS: Status: ACTIVE | Noted: 2023-07-24

## 2023-07-24 RX ORDER — NITROFURANTOIN MACROCRYSTALS 50 MG/1
50 CAPSULE ORAL NIGHTLY
COMMUNITY
End: 2023-07-24 | Stop reason: SDUPTHER

## 2023-07-24 RX ORDER — MIRABEGRON 50 MG/1
50 TABLET, EXTENDED RELEASE ORAL DAILY
Qty: 90 TABLET | Refills: 1 | Status: SHIPPED | OUTPATIENT
Start: 2023-07-24 | End: 2023-10-03 | Stop reason: SDUPTHER

## 2023-07-24 RX ORDER — NITROFURANTOIN MACROCRYSTALS 50 MG/1
50 CAPSULE ORAL NIGHTLY
Qty: 90 CAPSULE | Refills: 1 | Status: SHIPPED | OUTPATIENT
Start: 2023-07-24 | End: 2024-02-14 | Stop reason: SDUPTHER

## 2023-07-24 RX ORDER — NITROFURANTOIN 25; 75 MG/1; MG/1
100 CAPSULE ORAL 2 TIMES DAILY
Qty: 10 CAPSULE | Refills: 2 | Status: SHIPPED | OUTPATIENT
Start: 2023-07-24 | End: 2023-08-23

## 2023-10-03 DIAGNOSIS — N39.0 RECURRENT UTI: ICD-10-CM

## 2023-10-03 RX ORDER — MIRABEGRON 50 MG/1
50 TABLET, EXTENDED RELEASE ORAL DAILY
Qty: 90 TABLET | Refills: 1 | Status: SHIPPED | OUTPATIENT
Start: 2023-10-03 | End: 2024-03-13

## 2023-10-19 DIAGNOSIS — I10 PRIMARY HYPERTENSION: ICD-10-CM

## 2023-10-19 RX ORDER — AMLODIPINE BESYLATE 5 MG/1
5 TABLET ORAL DAILY
Qty: 90 TABLET | Refills: 0 | Status: SHIPPED | OUTPATIENT
Start: 2023-10-19 | End: 2024-02-14 | Stop reason: ALTCHOICE

## 2023-11-06 DIAGNOSIS — E03.9 HYPOTHYROIDISM, UNSPECIFIED TYPE: ICD-10-CM

## 2023-11-06 RX ORDER — LEVOTHYROXINE SODIUM 112 UG/1
112 TABLET ORAL DAILY
Qty: 90 TABLET | Refills: 3 | Status: SHIPPED | OUTPATIENT
Start: 2023-11-06 | End: 2023-11-08 | Stop reason: SDUPTHER

## 2023-11-07 NOTE — PROGRESS NOTES
Subjective   Patient ID: Jennifer Hdz is a 82 y.o. female who presents for her 6 month follow up multiple medical conditions       She feels her mind has gotten sharper   She continues to write things down on a calendar and that works well for her   She is walking 4- 5 miles a day and goes to exercise classes twice a week     Her hip bursa is still not good and she is going to make an appt with ortho for another injection   At the time she had her hip done she was having trouble sleeping due to pains in her arms   When she stands up and walks her arms get better   She has times where her neck is painful     She has had a cough for years but it is more aggravating     HEALTH  PAP 7/05 and last one   Mammo 4/14 , 5/15 , 8/16, 12/18, 12/19, 9/1/2020-, 6/2022, 7/2023  BD 1/14 and T-2.7 and +2.4,1/17 T-2.2 and +2.2, 1/2020 T-1.8 hip, 6/2022 T-1.3  FRAX 1/2020 is 3.6%. Reclast 2012 last time   Colon + polyps and 2006, 5/2011, 12/17 +polyp so Q 1, ordered 5/2022   EKG 12/13 , 6/15, 8/17, 12/18, 10/2020, 3/2021, 4/2023 ED  Urine 12/14, 12/15 , 12/17, 6/18, 12/18, 12/19 at    Flu 2016 , 2017, 9/18, 12/19 , 10/2020, 9/2021, 11/2022, 11/2023  TDAP ? and will update with injury   Pneumovax 2012 last one   Prevnar 12/15, 1/16  Zostavax 2007  Shingrix 5/1/19 and 2/7/19   Moderna CVD 2/18/2021 and 3/11/2021 booster 12/4/2021  Ophth- She is seeing someone at Dr Andres's office, she is due for exam 2018. She goes Q2 years        Review of Systems  All systems negative except those listed in the HPI      Objective   Visit Vitals  /85   Pulse 74   Temp 36.2 °C (97.1 °F)    Body mass index is 30.22 kg/m².      Physical Exam  Vitals reviewed.   Constitutional:       Appearance: Normal appearance. She is obese.   HENT:      Head: Normocephalic.      Right Ear: Tympanic membrane, ear canal and external ear normal.      Left Ear: Tympanic membrane, ear canal and external ear normal.      Nose: Nose normal.       Mouth/Throat:      Pharynx: Oropharynx is clear.   Eyes:      Conjunctiva/sclera: Conjunctivae normal.   Cardiovascular:      Rate and Rhythm: Normal rate and regular rhythm.      Pulses: Normal pulses.      Heart sounds: Normal heart sounds.   Pulmonary:      Effort: Pulmonary effort is normal.      Breath sounds: Normal breath sounds.   Abdominal:      General: Bowel sounds are normal.      Palpations: Abdomen is soft.   Musculoskeletal:         General: Normal range of motion.      Cervical back: Normal range of motion and neck supple.      Comments: Tenderness at the C5- 7 area    Skin:     General: Skin is warm.   Neurological:      General: No focal deficit present.      Mental Status: She is alert and oriented to person, place, and time.   Psychiatric:         Mood and Affect: Mood normal.         Behavior: Behavior normal.         Thought Content: Thought content normal.         Judgment: Judgment normal.       Assessment/Plan        Follow up completed  Reviewed her labs from 5/2023     She is  with 3 children   She has a previous history of tobacco use   Her  had dementia (not NPH) and passed in early 2022   She would have been  37 years in July 2022   KIRAN passed away from ALS in 8/18    She has good support with her family and friends   Her son does her finances for her, he goes to her house weekly     She has had a cough for years but it is more aggravating   Recommend using a humidifier during winter months     Admitted on 4/21/2023 and discharged on 4/22/2023 and diagnosed with acquired pneumonia bilaterally:  Resolved with treatment   Seen in the ED on 4/21/2023 for SOB.   CBC with a mildly elevated white count of 11.7. Hemoglobin 11.5.  Otherwise unremarkable.  D-dimer elevated to 1743.   CMP notable for a sodium of 128, potassium of 3.2 and a chloride of 94.   Troponin initially elevated at 21 with repeat at 17.  BNP normal at 107.   CT chest 4/2023 with evidence of  consolidation concerning for bilateral pneumonia.    EKG 4/2023 SR rate of 87, left axis, MA interval 150, QTc 450. Frequent PACs.   Patient was started on antibiotics : Rocephin and azithromycin.   CXR 5/2023 No significant change when compared to the prior examination.      She has experienced tinnitus for years.  This is not problematic for her  She went to [facility] hearing aids bilaterally     Her weight in office today is 149 with BMI of 30.22. We spent 15 minutes discussing diet and weight loss. The struggle of weight loss persists  Explained goal for BMI to be 25 or less       HTN: BP Stable   Continue losartan/HCTZ 100/12.5 mg daily and amlodipine 5 mg daily.  EKG 4/2023 SR rate of 87, left axis, MA interval 150, QTc 450. Frequent PACs.   Stress test 2017 was normal   Continue to monitor BP at home and call with elevated readings      I have spent 15 min face to face with this patient discussing their cardiac risk and behavioral therapies of nutrition choices and exercise. We are trying to eliminate habits that are contributing to their cardiac risk.  We agreed on a plan of how they can reduce their current CV risk   ACO score 6/6 IO 11/2023     Elevated lipids:    Explained goal for LDL to be less than 100 and ideally less than 70   Discussed diet and exercise for better control      Hypothyroid:    Continue levothyroxine 112 mcg a day. Refilled 11/2023  She saw Endo but there were no recommendations that were new      Left sided TMJ:   She follows with her dentist routinely      Concerns for memory issues: Explained being more mindful    She is having memory issues.   She has made plans with friends but forgets about the plans   She has not been driving and forgetting where she is going  She has no issues with leaving things running around the house   She keeps activities logged on a calendar and keeps a notebook close by   She reads a lot and does puzzle books.  She can try OTC memory supplements if she  would like.      Situational Depression/ insomnia:   She is no longer seeing a therapist   Her  passed in 2022.   She would have been  36 years in July 2022   Her stress got to a level that she disassociated when her  passed   Her son is doing her finances for her   Her KIRAN passed away from ALS in 8/18   She exercises to help relieve her stress.   She admits weight gain exacerbates her depressive Sx.   She likes to be in control of her environment   She could not tolerate Lexapro   Discontinued Remeron and Klonopin since it did not help.     Recurrent leg cramps:  She started eating potassium enriched foods and this has helped  She has good relief with yellow mustard prn     Bilateral varicose veins and spider veins right> left   Warned patient of S&S of phlebitis and she will call immediately if this is noted   Offered patient appt with a vascular surgeon- she declines for now 11/2023      Recurrent UTI- UTI 9/20/2021, 10/4/2021 and 10/12/2021.   Continue Macrobid 50 mg daily and D- Mannose daily   Continue Pyridium to use prn UTI.  Continue Estrace cream QOD and Myrbetriq 50 mg daily for OAB   CT urogram and cystoscopy in the past with Dr Mckenzie  She saw Dr Bush on 5/29/2020  Urine cultures 7/2023 negative  UA 7/2023 normal   She saw BERT Sandoval in 7/2023 for UTI and treated with cephalexin      Back pain and cervicalgia: On exam: Tenderness at the C5- 7 area 11/2023. She was having trouble sleeping due to pains in her arms and especially in her hands. When she stands up and walks her arms get better, it takes 2- 3 minutes for pain to improve. She has times where her neck is painful.   She had nerve blocks on 1/21/2022 with Dr Espinoza.   She has tried many different modalities to control pain and they have all failed   MRI L/S 12/2021 showed severe spinal stenosis lower spine, worse on left side.   MRI of C/S 2/2022 showed multilevel degenerative changes of the cervical spine. Central canal  stenosis is most pronounced at C5- 6. There is multilevel neuroforaminal narrowing due to facet and uncovertebral joint hypertrophy  Continue gabapentin 300 mg 1 tablet QHS and tizanidine prn.   She had back surgery on 3 vertebrae (L3,4, and L5) with Dr Rodriguez 4/21/2022.   She had a CT scan of L/S and nothing was found 2022  She is no longer taking Tramadol   Recommend and orders placed for PT 11/2023   Recommend and orders placed for Xray C/S for further evaluation  11/2023  Recommend and orders placed for EMG studies for further evaluation 11/2023   She has followed with Dr Rodriguez in the past.      Left hip /arthritis issues:   She saw ortho 7/2022 and had a nerve block but it did not help   S/p LTH anterior approach 10/5/2022.   She had a bursa injection and it did not help 2022  She saw a different ortho physician and had injection to the bursa 12/2022   She saw Dr Kaye in 6/2023 and had left hip bursa injection      Right shoulder complete reversal on 10/5/2020:   She saw Dr Meza for a second opinion and told she fractured her acromion   She was told there is nothing more that can be done for her right shoulder   She completed PT and doing the exercises at home      Recurrent Cold sores:  She has cold sores because of stress. She states she has a history of cold sores.  She is using L-lysine and Herpecin for cold sores  Continue Valtrex 1 GM to take as directed with onset of cold sore      Vitamin D def: Levels 36 on labs in 6/2022  Recommend OTC Vitamin D 2000 UT daily      Dermatology: she has multiple senile keratosis, nothing suspicious on exam 11/2023  She had BCC removed from her forehead on 2/11/2022  She had BCC removed on 2/18/2022  Continue following with dermatology      Pap no longer needed   Mammo at [facility] + genet in 7/2023 was negative    Breast exam normal 5/2023     BD 6/2022 T-1.3. Continue OTC Ca 600 mg BID, OTC Vitamin D 2000 UT daily and eat 2 servings of calcium enriched foods daily.  Discussed importance of weight bearing exercises. FRAX score was only 14.   Reclast last injection in 12/17.     Colonoscopy 12/17 +polyp and orders placed 5/2022 but not done      Ophth:  She is seeing someone at Dr Andres's office, she was seen in 2019.   She goes Q 2 years for Rx check.   She will have her next eye exam results faxed to my office in order to update her medical records.      Her daughter Lizeth is her MPOA. Recommend she bring a copy of her Advanced Directives in office to have scanned in her chart 5/2023        Flu 2016, 2017, 9/18, 12/19 , 10/2020, 9/2021, 11/2022, 11/2023   TDAP ? and will update with injury   Pneumovax 2012 last one   Prevnar 12/15, 1/16  Zostavax 2007  Shingrix 5/1/19 and 2/7/19   Moderna CVD vaccine 2/18/2021 and 3/11/2021 booster 12/4/2021    Some elements in the chart were copied from Dr. Azul's last office visit with patient.   Notes have been updated where appropriate, and reflect my current medical decision making from today.     Return in 6 months for MCR or sooner if needed    (MCR due 5/2024)      Scribe Attestation  By signing my name below, I, Amy Greer , Scribe   attest that this documentation has been prepared under the direction and in the presence of Darlene Azul MD.

## 2023-11-08 ENCOUNTER — ANCILLARY PROCEDURE (OUTPATIENT)
Dept: RADIOLOGY | Facility: CLINIC | Age: 83
End: 2023-11-08
Payer: MEDICARE

## 2023-11-08 ENCOUNTER — OFFICE VISIT (OUTPATIENT)
Dept: PRIMARY CARE | Facility: CLINIC | Age: 83
End: 2023-11-08
Payer: MEDICARE

## 2023-11-08 VITALS
HEART RATE: 74 BPM | SYSTOLIC BLOOD PRESSURE: 138 MMHG | OXYGEN SATURATION: 98 % | WEIGHT: 149.6 LBS | TEMPERATURE: 97.1 F | HEIGHT: 59 IN | BODY MASS INDEX: 30.16 KG/M2 | DIASTOLIC BLOOD PRESSURE: 85 MMHG

## 2023-11-08 DIAGNOSIS — G95.9 DISEASE OF SPINAL CORD, UNSPECIFIED (MULTI): Primary | ICD-10-CM

## 2023-11-08 DIAGNOSIS — M81.0 AGE-RELATED OSTEOPOROSIS WITHOUT CURRENT PATHOLOGICAL FRACTURE: ICD-10-CM

## 2023-11-08 DIAGNOSIS — I10 PRIMARY HYPERTENSION: ICD-10-CM

## 2023-11-08 DIAGNOSIS — M79.621 PAIN IN BOTH UPPER ARMS: ICD-10-CM

## 2023-11-08 DIAGNOSIS — M54.2 NECK PAIN: ICD-10-CM

## 2023-11-08 DIAGNOSIS — M17.0 PRIMARY OSTEOARTHRITIS OF BOTH KNEES: ICD-10-CM

## 2023-11-08 DIAGNOSIS — E78.5 DYSLIPIDEMIA: ICD-10-CM

## 2023-11-08 DIAGNOSIS — M79.622 PAIN IN BOTH UPPER ARMS: ICD-10-CM

## 2023-11-08 DIAGNOSIS — M48.061 SPINAL STENOSIS OF LUMBAR REGION WITHOUT NEUROGENIC CLAUDICATION: ICD-10-CM

## 2023-11-08 DIAGNOSIS — E03.9 HYPOTHYROIDISM, UNSPECIFIED TYPE: ICD-10-CM

## 2023-11-08 PROCEDURE — 1159F MED LIST DOCD IN RCRD: CPT | Performed by: INTERNAL MEDICINE

## 2023-11-08 PROCEDURE — G0008 ADMIN INFLUENZA VIRUS VAC: HCPCS | Performed by: INTERNAL MEDICINE

## 2023-11-08 PROCEDURE — 90662 IIV NO PRSV INCREASED AG IM: CPT | Performed by: INTERNAL MEDICINE

## 2023-11-08 PROCEDURE — 72040 X-RAY EXAM NECK SPINE 2-3 VW: CPT | Performed by: RADIOLOGY

## 2023-11-08 PROCEDURE — 72040 X-RAY EXAM NECK SPINE 2-3 VW: CPT | Mod: FY

## 2023-11-08 PROCEDURE — 99214 OFFICE O/P EST MOD 30 MIN: CPT | Performed by: INTERNAL MEDICINE

## 2023-11-08 PROCEDURE — 1036F TOBACCO NON-USER: CPT | Performed by: INTERNAL MEDICINE

## 2023-11-08 PROCEDURE — 3075F SYST BP GE 130 - 139MM HG: CPT | Performed by: INTERNAL MEDICINE

## 2023-11-08 PROCEDURE — 3079F DIAST BP 80-89 MM HG: CPT | Performed by: INTERNAL MEDICINE

## 2023-11-08 PROCEDURE — 1160F RVW MEDS BY RX/DR IN RCRD: CPT | Performed by: INTERNAL MEDICINE

## 2023-11-08 RX ORDER — LEVOTHYROXINE SODIUM 112 UG/1
112 TABLET ORAL DAILY
Qty: 90 TABLET | Refills: 3 | Status: SHIPPED | OUTPATIENT
Start: 2023-11-08 | End: 2024-05-08 | Stop reason: SDUPTHER

## 2023-11-08 ASSESSMENT — PATIENT HEALTH QUESTIONNAIRE - PHQ9
1. LITTLE INTEREST OR PLEASURE IN DOING THINGS: NOT AT ALL
2. FEELING DOWN, DEPRESSED OR HOPELESS: NOT AT ALL
SUM OF ALL RESPONSES TO PHQ9 QUESTIONS 1 AND 2: 0

## 2023-12-13 DIAGNOSIS — N95.2 ATROPHIC VAGINITIS: Primary | ICD-10-CM

## 2023-12-13 RX ORDER — ESTRADIOL 0.1 MG/G
CREAM VAGINAL
Qty: 42.5 G | Refills: 2 | Status: SHIPPED | OUTPATIENT
Start: 2023-12-13

## 2024-01-05 ENCOUNTER — APPOINTMENT (OUTPATIENT)
Dept: PRIMARY CARE | Facility: CLINIC | Age: 84
End: 2024-01-05
Payer: MEDICARE

## 2024-02-09 ENCOUNTER — HOSPITAL ENCOUNTER (OUTPATIENT)
Dept: NEUROLOGY | Facility: CLINIC | Age: 84
Discharge: HOME | End: 2024-02-09
Payer: MEDICARE

## 2024-02-09 DIAGNOSIS — M54.2 NECK PAIN: ICD-10-CM

## 2024-02-09 DIAGNOSIS — M79.621 PAIN IN BOTH UPPER ARMS: ICD-10-CM

## 2024-02-09 DIAGNOSIS — M79.622 PAIN IN BOTH UPPER ARMS: ICD-10-CM

## 2024-02-09 PROCEDURE — 95886 MUSC TEST DONE W/N TEST COMP: CPT | Mod: 50 | Performed by: SPECIALIST

## 2024-02-09 PROCEDURE — 95910 NRV CNDJ TEST 7-8 STUDIES: CPT | Performed by: SPECIALIST

## 2024-02-09 PROCEDURE — 95886 MUSC TEST DONE W/N TEST COMP: CPT | Performed by: SPECIALIST

## 2024-02-13 NOTE — PROGRESS NOTES
Subjective   Reason for Visit: Jennifer Hdz is an 83 y.o. female here for her Subsequent Medicare Assessment        She states she fell while in the backyard   She slipped after stepping on a stone in the yard.  She landed on her right side. She did not hit her head.  She had trouble getting up because of the mud she fell in.  She developed cramping in her legs when she fell and that made it difficulty to get up   She had her phone with her and was able to call for someone to help her get up     She is seeing a therapist for her neck   She has issues with her hip still. She saw ortho.  Her therapist feels her issues are fixable and will continue therapy     Her BM cycle between mari and soft stools since she had pneumonia        HEALTH  PAP 7/05 and no longer needs to repeat   Mammo 4/14, 5/15, 8/16, 12/18, 12/19, 9/2020-, 6/2022, 7/2023, ordered 2/2024   BD 1/14 T-2.7, 1/17 T-2.2, 1/2020 T-1.8, 6/2022 T-1.3, ordered 2/2024   Colon  2006 polyps, 5/2011 polyp, 12/17 polyp so Q 1 poor prep, ordered 5/2022   EKG 12/13, 6/15, 8/17, 12/18, 10/2020, 3/2021, 4/2023 ED  Urine 12/14, 12/15, 12/17, 6/18, 12/18, 12/19   Flu 2016, 2017, 9/18, 12/19, 10/2020, 9/2021, 11/2022, 11/2023  TDAP ? and will update with injury   Pneumovax 2012 last one   Prevnar 12/15, 1/16  Zostavax 2007  Shingrix 5/1/19 and 2/7/19   Moderna CVD 2/18/2021 and 3/11/2021 booster 12/4/2021  Ophth- She is seeing someone at Dr Andres's office.      Patient Care Team:  Darlene Azul MD as PCP - General  Darlene Azul MD as PCP - tna Medicare Advantage PCP     Review of Systems  All systems negative except those listed in the HPI      Past Medical, Surgical, and Family History reviewed and updated in chart.  Reviewed all medications by prescribing practitioner or clinical pharmacist   (such as prescriptions, OTCs, herbal therapies and supplements) and documented in the medical record       Objective   Vitals:  Visit Vitals  /70 (BP  Location: Left arm, Patient Position: Sitting)   Pulse 60   Temp 36.3 °C (97.3 °F) (Temporal)    Body mass index is 30.5 kg/m².        Physical Exam  Vitals reviewed.   Constitutional:       Appearance: Normal appearance. She is obese.   HENT:      Head: Normocephalic.      Right Ear: Tympanic membrane, ear canal and external ear normal.      Left Ear: Tympanic membrane, ear canal and external ear normal.      Nose: Nose normal.      Mouth/Throat:      Pharynx: Oropharynx is clear.   Eyes:      Conjunctiva/sclera: Conjunctivae normal.   Cardiovascular:      Rate and Rhythm: Normal rate and regular rhythm.      Pulses: Normal pulses.      Heart sounds: Normal heart sounds.   Pulmonary:      Effort: Pulmonary effort is normal.      Breath sounds: Normal breath sounds.   Abdominal:      General: Bowel sounds are normal.      Palpations: Abdomen is soft.   Musculoskeletal:         General: Normal range of motion.      Cervical back: Normal range of motion and neck supple.   Skin:     General: Skin is warm.      Comments: she has multiple senile keratosis, nothing suspicious on exam   Neurological:      General: No focal deficit present.      Mental Status: She is alert and oriented to person, place, and time.   Psychiatric:         Mood and Affect: Mood normal.         Behavior: Behavior normal.         Thought Content: Thought content normal.         Judgment: Judgment normal.       Assessment/Plan   Problem List Items Addressed This Visit    None       Subsequent Medicare Assessment completed  Labs ordered     Medicare Wellness completed  -  Discussed healthy diet and regular exercise.    -  Physical exam overall unremarkable. Immunizations reviewed and updated accordingly. Healthy lifestyle choices discussed (tobacco avoidance, appropriate alcohol use, avoidance of illicit substances).   -  Patient is wearing seatbelt.   -  Screening lab work ordered as indicated.    -  Age appropriate screening tests reviewed with  patient.         We spent 15 minutes discussing depression screen and there is nothing found that is of concern for underling depression. The PQH form was filled and the meds reviewed. No depression to report      We spent 15 minutes discussing alcohol use and there are no concerns about overuse. The 15 min was spent in going over any issues of use of alcohol. None      She has grab bars in the shower.  She has not fallen recently and no risk of falls in the house   She has good lighting around the house and functioning smoke detectors.     She is  with 3 children. She has a previous history of tobacco use   Her  had dementia (not NPH) and passed in 2022. She was  35 years   She has good support with her family and friends   Her son does her finances for her, he goes to her house weekly     Her BM cycle between mari and soft stools since she had pneumonia   Recommend taking Metamucil 1 Tbsp daily and increase fiber in diet   Recommend Miralax Q 3 days to avoid constipation   I have written these instructions out for her   She is due for a colonoscopy. She will make an appt with GI to see if she can schedule one due to age 2/2024.     Fall:   She states she fell while in the backyard   She slipped after stepping on a stone in the yard.  She landed on her right side. She did not hit her head.  She had trouble getting up because of the mud she fell in.  She developed cramping in her legs when she fell and that made it difficulty to get up  She had her phone with her and was able to call for someone to help her get up    She did not need to seek medical attention after the fall      She has experienced tinnitus for years.  This is not problematic for her  She went to [facility] for hearing aids bilaterally     Her weight in office today is 151 with BMI of 30.50. We spent 15 minutes discussing diet and weight loss. The struggle of weight loss persists  Explained goal for BMI to be 25 or less        HTN: BP Stable   Continue losartan/HCTZ 100/12.5 mg daily   EKG 4/2023 SR rate of 87, left axis, AK interval 150, QTc 450. Frequent PACs.   Stress test 2017 was normal   Continue to monitor BP at home and call with elevated readings      I have spent 15 min face to face with this patient discussing their cardiac risk and behavioral therapies of nutrition choices and exercise. We are trying to eliminate habits that are contributing to their cardiac risk.  We agreed on a plan of how they can reduce their current CV risk   ACO score 6/6 IO 2/2024     Elevated lipids: Labs ordered and we will adjust if indicated  2/2024   Explained goal for LDL to be less than 100 and ideally less than 70   Discussed diet and exercise for better control      Hypothyroid: Labs ordered and we will adjust if indicated  2/2024   Continue levothyroxine 112 mcg a day.    She saw Endo but there were no recommendations that were new      Left sided TMJ:   She follows with her dentist routinely      Concerns for memory issues: Explained being more mindful    She is having memory issues.   She has made plans with friends but forgets about the plans   She has not been driving and forgetting where she is going  She has no issues with leaving things running around the house   She keeps activities logged on a calendar and keeps a notebook close by   She reads a lot and does puzzle books.  She can try OTC memory supplements if she would like.      Situational Depression/ insomnia:   She is no longer seeing a therapist   Her  passed in 2022, they were  35 years.   Her stress got to a level that she disassociated when her  passed   Her son is doing her finances for her   Her KIRAN passed away from [facility] in 8/18   She exercises to help relieve her stress.   She admits weight gain exacerbates her depressive Sx.   She likes to be in control of her environment   She could not tolerate Lexapro   Discontinued Remeron and Klonopin since it  did not help.     Recurrent leg cramps:  She started eating potassium enriched foods and this has helped  She has good relief with yellow mustard prn     Bilateral varicose veins and spider veins right> left   Recommend wearing support stockings during the day   Offered patient appt with a vascular surgeon- she declines for now 11/2023      Recurrent UTI-   UTI 9/20/2021, 10/4/2021 and 10/12/2021, 7/2023.   Continue Macrobid 50 mg daily and D- Mannose daily. Refilled Macrobid 2/2024  Continue Pyridium to use prn and Estrace cream QOD   Continue Myrbetriq 50 mg daily for OAB   CT urogram and cystoscopy in the past with Dr Jonah DICKSON 7/2023 normal. Urine cultures 7/2023 negative   She saw BERT Sandoval in 7/2023       Back pain and cervicalgia:  Reviewed results of EMG studies with patient 2/2024  She had nerve blocks on 1/21/2022 with Dr Espinoza.   She has tried many different modalities to control pain and they have all failed   MRI L/S 12/2021 showed severe spinal stenosis lower spine, worse on left side.   MRI of C/S 2/2022 showed multilevel degenerative changes of the cervical spine. Central canal stenosis is most pronounced at C5- 6. There is multilevel neuroforaminal narrowing due to facet and uncovertebral joint hypertrophy  EMG  2/2024 studies showed chronic CAT- 1 radiculopathy and carpal tunnel bilat   - S/p back surgery on 3 vertebrae (L3,4, and L5) with Dr Rodriguez 4/21/2022.   Continue gabapentin 300 mg 1 tablet QHS and tizanidine prn.    She is no longer taking Tramadol      Left hip /arthritis issues: She has issues with her hip still. She saw ortho. Ortho feels it is the tendons and muscles that are bothering her. Her therapist feels her issues are fixable and will continue therapy.   She saw ortho 7/2022 and had a nerve block but it did not help   S/p LTH anterior approach 10/5/2022 with Dr Perla   She had a bursa injection and it did not help 2022  She saw a different ortho physician and had injection to  the bursa 12/2022   She saw Dr Kaye in 11/2023 and had left hip bursa injection      Right shoulder complete reversal on 10/5/2020:   She saw Dr Meza for a second opinion and told she fractured her acromion   She was told there is nothing more that can be done for her right shoulder   She completed PT and doing the exercises at home     Bilateral carpal tunnel: Reviewed results of EMG studies with patient 2/2024  EMG  2/2024 studies showed chronic CAT- 1 radiculopathy and carpal tunnel bilat  Recommend she get wrist braces bilaterally for carpal tunnel to wear at night only      Recurrent Cold sores:  She has cold sores because of stress. She states she has a history of cold sores.  She is using L-lysine and Herpecin for cold sores  Continue Valtrex 1 GM to take as directed with onset of cold sore      Vitamin D def: Levels 36 on labs in 6/2022  Continue OTC Vitamin D 2000 UT daily      Dermatology: she has multiple senile keratosis, nothing suspicious on exam 2/2024  She had BCC removed from her forehead on 2/11/2022  She had BCC removed on 2/18/2022  Continue following with dermatology      Pap no longer needed   Mammo at [facility] + genet in 7/2023 was negative and ordered 2/2024   Breast exam normal 2/2024      BD 6/2022 T-1.3. Continue OTC Ca 600 mg BID, OTC Vitamin D 2000 UT daily and eat 2 servings of calcium enriched foods daily. Discussed importance of weight bearing exercises. FRAX score was only 14.   Reclast last injection in 12/17.     Colonoscopy 12/17 +polyp and orders placed 5/2022 but not done. She will call GI and make an appt to see if she needs to do the colonoscopy again due to age 2/2024     Ophth:  She is seeing someone at Dr Andres's office, she was seen in 2019.   She goes Q 2 years for Rx check.   She will have her next eye exam results faxed to my office in order to update her medical records.      I spent 15 min with the patient discussing their wishes for end of life choices.   We  discussed the need for a Living Will and that wishes should be discussed with Family. The DNR status was reviewed, and we discussed the options of this and, the DNR _CC options as well.   We also went over how important it was to have these choices written down and clear for any surviving family so that their wishes are followed   The patient and I came to to following agreement :   Her daughter Lizeth is her MPOA.   Copy of her Advanced Directives scanned in her chart 5/2023        Flu 2016, 2017, 9/18, 12/19, 10/2020, 9/2021, 11/2022, 11/2023   TDAP ? and will update with injury   Pneumovax 2012 last one   Prevnar 12/15, 1/16  Zostavax 2007  Shingrix 5/1/19 and 2/7/19   Moderna CVD vaccine 2/18/2021 and 3/11/2021 booster 12/4/2021     Some elements in the chart were copied from Dr. Azul's last office visit with patient. Notes have been updated where appropriate, and reflect my current medical decision making from today.     Return in 6 months for follow up or sooner if needed    (MCR due 2/2025)      Scribe Attestation  By signing my name below, I, Amy Greer , Scribe   attest that this documentation has been prepared under the direction and in the presence of Darlene Azul MD.

## 2024-02-14 ENCOUNTER — OFFICE VISIT (OUTPATIENT)
Dept: PRIMARY CARE | Facility: CLINIC | Age: 84
End: 2024-02-14
Payer: MEDICARE

## 2024-02-14 VITALS
TEMPERATURE: 97.3 F | SYSTOLIC BLOOD PRESSURE: 124 MMHG | DIASTOLIC BLOOD PRESSURE: 70 MMHG | HEART RATE: 60 BPM | WEIGHT: 151 LBS | BODY MASS INDEX: 30.5 KG/M2 | OXYGEN SATURATION: 98 %

## 2024-02-14 DIAGNOSIS — M25.552 PAIN OF LEFT HIP JOINT: ICD-10-CM

## 2024-02-14 DIAGNOSIS — M48.061 SPINAL STENOSIS OF LUMBAR REGION WITHOUT NEUROGENIC CLAUDICATION: ICD-10-CM

## 2024-02-14 DIAGNOSIS — M54.12 CERVICAL RADICULOPATHY AT C8: ICD-10-CM

## 2024-02-14 DIAGNOSIS — F43.9 SITUATIONAL STRESS: ICD-10-CM

## 2024-02-14 DIAGNOSIS — E03.9 HYPOTHYROIDISM, UNSPECIFIED TYPE: ICD-10-CM

## 2024-02-14 DIAGNOSIS — M81.0 AGE-RELATED OSTEOPOROSIS WITHOUT CURRENT PATHOLOGICAL FRACTURE: ICD-10-CM

## 2024-02-14 DIAGNOSIS — E78.5 DYSLIPIDEMIA: ICD-10-CM

## 2024-02-14 DIAGNOSIS — N39.0 RECURRENT UTI: ICD-10-CM

## 2024-02-14 DIAGNOSIS — G95.9 DISEASE OF SPINAL CORD, UNSPECIFIED (MULTI): ICD-10-CM

## 2024-02-14 DIAGNOSIS — G56.03 CARPAL TUNNEL SYNDROME, BILATERAL: ICD-10-CM

## 2024-02-14 DIAGNOSIS — K21.00 GASTROESOPHAGEAL REFLUX DISEASE WITH ESOPHAGITIS WITHOUT HEMORRHAGE: ICD-10-CM

## 2024-02-14 DIAGNOSIS — E87.6 HYPOKALEMIA: ICD-10-CM

## 2024-02-14 DIAGNOSIS — Z78.0 ASYMPTOMATIC MENOPAUSAL STATE: ICD-10-CM

## 2024-02-14 DIAGNOSIS — Z00.00 ROUTINE GENERAL MEDICAL EXAMINATION AT HEALTH CARE FACILITY: Primary | ICD-10-CM

## 2024-02-14 DIAGNOSIS — M17.0 PRIMARY OSTEOARTHRITIS OF BOTH KNEES: ICD-10-CM

## 2024-02-14 DIAGNOSIS — Z12.31 VISIT FOR SCREENING MAMMOGRAM: ICD-10-CM

## 2024-02-14 DIAGNOSIS — I10 PRIMARY HYPERTENSION: ICD-10-CM

## 2024-02-14 PROBLEM — J18.9 COMMUNITY ACQUIRED PNEUMONIA OF BOTH LUNGS: Status: RESOLVED | Noted: 2023-07-24 | Resolved: 2024-02-14

## 2024-02-14 PROBLEM — E66.3 OVERWEIGHT WITH BODY MASS INDEX (BMI) OF 29 TO 29.9 IN ADULT: Status: RESOLVED | Noted: 2023-03-28 | Resolved: 2024-02-14

## 2024-02-14 PROBLEM — R09.02 HYPOXIA: Status: RESOLVED | Noted: 2023-07-24 | Resolved: 2024-02-14

## 2024-02-14 PROCEDURE — G0439 PPPS, SUBSEQ VISIT: HCPCS | Performed by: INTERNAL MEDICINE

## 2024-02-14 PROCEDURE — 99214 OFFICE O/P EST MOD 30 MIN: CPT | Performed by: INTERNAL MEDICINE

## 2024-02-14 PROCEDURE — 1036F TOBACCO NON-USER: CPT | Performed by: INTERNAL MEDICINE

## 2024-02-14 PROCEDURE — 3074F SYST BP LT 130 MM HG: CPT | Performed by: INTERNAL MEDICINE

## 2024-02-14 PROCEDURE — 1159F MED LIST DOCD IN RCRD: CPT | Performed by: INTERNAL MEDICINE

## 2024-02-14 PROCEDURE — 1170F FXNL STATUS ASSESSED: CPT | Performed by: INTERNAL MEDICINE

## 2024-02-14 PROCEDURE — 1157F ADVNC CARE PLAN IN RCRD: CPT | Performed by: INTERNAL MEDICINE

## 2024-02-14 PROCEDURE — 1123F ACP DISCUSS/DSCN MKR DOCD: CPT | Performed by: INTERNAL MEDICINE

## 2024-02-14 PROCEDURE — 3078F DIAST BP <80 MM HG: CPT | Performed by: INTERNAL MEDICINE

## 2024-02-14 PROCEDURE — 1158F ADVNC CARE PLAN TLK DOCD: CPT | Performed by: INTERNAL MEDICINE

## 2024-02-14 RX ORDER — NITROFURANTOIN MACROCRYSTALS 50 MG/1
50 CAPSULE ORAL NIGHTLY
Qty: 90 CAPSULE | Refills: 3 | Status: SHIPPED | OUTPATIENT
Start: 2024-02-14 | End: 2024-05-29 | Stop reason: ALTCHOICE

## 2024-02-14 ASSESSMENT — ACTIVITIES OF DAILY LIVING (ADL)
TAKING_MEDICATION: INDEPENDENT
GROCERY_SHOPPING: INDEPENDENT
BATHING: INDEPENDENT
MANAGING_FINANCES: INDEPENDENT
DRESSING: INDEPENDENT
DOING_HOUSEWORK: INDEPENDENT

## 2024-02-14 ASSESSMENT — ENCOUNTER SYMPTOMS
OCCASIONAL FEELINGS OF UNSTEADINESS: 0
DEPRESSION: 0
LOSS OF SENSATION IN FEET: 0

## 2024-02-14 ASSESSMENT — PATIENT HEALTH QUESTIONNAIRE - PHQ9
2. FEELING DOWN, DEPRESSED OR HOPELESS: NOT AT ALL
1. LITTLE INTEREST OR PLEASURE IN DOING THINGS: NOT AT ALL
SUM OF ALL RESPONSES TO PHQ9 QUESTIONS 1 AND 2: 0

## 2024-02-14 NOTE — PROGRESS NOTES
Subjective   Reason for Visit: Jennifer Hdz is an 83 y.o. female here for a Medicare Wellness visit.     Past Medical, Surgical, and Family History reviewed and updated in chart.    Reviewed all medications by prescribing practitioner or clinical pharmacist (such as prescriptions, OTCs, herbal therapies and supplements) and documented in the medical record.    HPI    Patient Care Team:  Darlene Azul MD as PCP - General  Darlene Azul MD as PCP - [employer]vantage PCP     Review of Systems    Objective   Vitals:  /70 (BP Location: Left arm, Patient Position: Sitting)   Pulse 60   Temp 36.3 °C (97.3 °F) (Temporal)   Wt 68.5 kg (151 lb)   SpO2 98%   BMI 30.50 kg/m²       Physical Exam    Assessment/Plan   Problem List Items Addressed This Visit    None  Visit Diagnoses       Routine general medical examination at health care facility    -  Primary               Patient was identified as a fall risk. Risk prevention instructions provided.

## 2024-02-14 NOTE — PATIENT INSTRUCTIONS
It was a pleasure to see you today.  I would like to remind you about importance of a healthy lifestyle in order to improve your well-being and live longer. Try to engage in physical activities for at least 150 minutes per week.  Eat about 10 servings of fruits and vegetables daily. My advice is 2 servings of fruits and 8 servings of vegetables. For vegetables choose at least half of them green and at least half of them fresh.  Please avoid sugar, salt, fried food and saturated fat.  Weight loss is advised. Target BMI: below 25. Please follow low carbohydrate diet and daily exercise routine for at least 30 minutes. Nutritional consultation is available, please let me know if you are interested. I will be happy to discuss details with you if interested.   Have a good day and stay well.      Obesity is a chronic, relapsing, progressive, treatable, multifactorial, neurobehavioral disease, where in an increase in body fat promotes adipose (fat) tissue dysfunction, as well as biomechanical stress on the body which can result in adverse metabolic, biomechanical, and psychosocial health consequences, in addition to reducing quality of life and lifespan.   Without treatment, obesity is likely to progress and worsen, further increase the patient's risk for numerous health conditions caused by excess adiposity and increasing the risk for premature death.     - Counseling provided on impact of diet, physical activity, stress & sleep in treatment of obesity.    - Counseled on reduced calorie, high protein, high fiber, whole foods diet.  Counseling on benefits of avoidance of frequent intake of processed foods and liquid calories.   - Counseled on behavioral modification strategies and tools to support weight loss.   - Reviewed pathophysiology of obesity in context of relationship to nutrition, energy balance and environmental factors that influence nutrition and energy balance outcomes.  - Counseled on various behavioral  strategies and self monitoring practices to enhance understanding and individual assessment of energy balance, how various changes in types of foods consumed and macronutrient distribution can impact appetite regulation and be used as a tool in treatment of obesity.           Ways to Help Prevent Falls at Home    Quick Tips   ? Ask for help if you need it. Most people want to help!   ? Get up slowly after sitting or laying down   ? Wear a medical alert device or keep cell phone in your pocket   ? Use night lights, especially areas near a bathroom   ? Keep the items you use often within reach on a small stool or end table   ? Use an assistive device such as walker or cane, as directed by provider/physical therapy   ? Use a non-slip mat and grab bars in your bathroom. Look for home health sections for best options     Other Areas to Focus On   ? Exercise and nutrition: Regular exercise or taking a falls prevention class are great ways improve strength and balance. Don’t forget to stay hydrated and bring a snack!   ? Medicine side effects: Some medicines can make you sleepy or dizzy, which could cause a fall. Ask your healthcare provider about the side effects your medicines could cause. Be sure to let them know if you take any vitamins or supplements as well.   ? Tripping hazards: Remove items you could trip on, such as loose mats, rugs, cords, and clutter. Wear closed toe shoes with rubber soles.   ? Health and wellness: Get regular checkups with your healthcare provider, plus routine vision and hearing screenings. Talk with your healthcare provider about:   o Your medicines and the possible side effects - bring them in a bag if that is easier!   o Problems with balance or feeling dizzy   o Ways to promote bone health, such as Vitamin D and calcium supplements   o Questions or concerns about falling     *Ask your healthcare team if you have questions     ©[facility], 2022

## 2024-03-13 ENCOUNTER — HOSPITAL ENCOUNTER (OUTPATIENT)
Dept: RADIOLOGY | Facility: CLINIC | Age: 84
Discharge: HOME | End: 2024-03-13
Payer: MEDICARE

## 2024-03-13 ENCOUNTER — OFFICE VISIT (OUTPATIENT)
Dept: PRIMARY CARE | Facility: CLINIC | Age: 84
End: 2024-03-13
Payer: MEDICARE

## 2024-03-13 ENCOUNTER — TELEPHONE (OUTPATIENT)
Dept: PRIMARY CARE | Facility: CLINIC | Age: 84
End: 2024-03-13

## 2024-03-13 VITALS
OXYGEN SATURATION: 97 % | TEMPERATURE: 97.5 F | DIASTOLIC BLOOD PRESSURE: 80 MMHG | SYSTOLIC BLOOD PRESSURE: 136 MMHG | HEART RATE: 86 BPM | BODY MASS INDEX: 29.84 KG/M2 | WEIGHT: 148 LBS | HEIGHT: 59 IN

## 2024-03-13 DIAGNOSIS — R05.1 ACUTE COUGH: ICD-10-CM

## 2024-03-13 DIAGNOSIS — R07.81 RIB PAIN ON RIGHT SIDE: ICD-10-CM

## 2024-03-13 DIAGNOSIS — N39.0 RECURRENT UTI: ICD-10-CM

## 2024-03-13 DIAGNOSIS — N39.46 MIXED STRESS AND URGE URINARY INCONTINENCE: ICD-10-CM

## 2024-03-13 DIAGNOSIS — N95.2 ATROPHIC VAGINITIS: ICD-10-CM

## 2024-03-13 DIAGNOSIS — E78.5 DYSLIPIDEMIA: ICD-10-CM

## 2024-03-13 DIAGNOSIS — J01.00 ACUTE NON-RECURRENT MAXILLARY SINUSITIS: Primary | ICD-10-CM

## 2024-03-13 DIAGNOSIS — I10 PRIMARY HYPERTENSION: ICD-10-CM

## 2024-03-13 DIAGNOSIS — E03.9 HYPOTHYROIDISM, UNSPECIFIED TYPE: ICD-10-CM

## 2024-03-13 PROCEDURE — 71100 X-RAY EXAM RIBS UNI 2 VIEWS: CPT | Mod: RIGHT SIDE | Performed by: RADIOLOGY

## 2024-03-13 PROCEDURE — 1159F MED LIST DOCD IN RCRD: CPT | Performed by: INTERNAL MEDICINE

## 2024-03-13 PROCEDURE — 1157F ADVNC CARE PLAN IN RCRD: CPT | Performed by: INTERNAL MEDICINE

## 2024-03-13 PROCEDURE — 1160F RVW MEDS BY RX/DR IN RCRD: CPT | Performed by: INTERNAL MEDICINE

## 2024-03-13 PROCEDURE — 99214 OFFICE O/P EST MOD 30 MIN: CPT | Performed by: INTERNAL MEDICINE

## 2024-03-13 PROCEDURE — 3075F SYST BP GE 130 - 139MM HG: CPT | Performed by: INTERNAL MEDICINE

## 2024-03-13 PROCEDURE — G2211 COMPLEX E/M VISIT ADD ON: HCPCS | Performed by: INTERNAL MEDICINE

## 2024-03-13 PROCEDURE — 71046 X-RAY EXAM CHEST 2 VIEWS: CPT

## 2024-03-13 PROCEDURE — 71100 X-RAY EXAM RIBS UNI 2 VIEWS: CPT | Mod: RT

## 2024-03-13 PROCEDURE — 1123F ACP DISCUSS/DSCN MKR DOCD: CPT | Performed by: INTERNAL MEDICINE

## 2024-03-13 PROCEDURE — 3079F DIAST BP 80-89 MM HG: CPT | Performed by: INTERNAL MEDICINE

## 2024-03-13 PROCEDURE — 71046 X-RAY EXAM CHEST 2 VIEWS: CPT | Mod: RIGHT SIDE | Performed by: RADIOLOGY

## 2024-03-13 PROCEDURE — 1036F TOBACCO NON-USER: CPT | Performed by: INTERNAL MEDICINE

## 2024-03-13 RX ORDER — BENZONATATE 100 MG/1
100 CAPSULE ORAL 3 TIMES DAILY PRN
Qty: 42 CAPSULE | Refills: 0 | Status: SHIPPED | OUTPATIENT
Start: 2024-03-13 | End: 2024-04-12

## 2024-03-13 RX ORDER — AMOXICILLIN AND CLAVULANATE POTASSIUM 875; 125 MG/1; MG/1
875 TABLET, FILM COATED ORAL 2 TIMES DAILY
Qty: 20 TABLET | Refills: 0 | Status: SHIPPED | OUTPATIENT
Start: 2024-03-13 | End: 2024-03-23

## 2024-03-13 NOTE — PATIENT INSTRUCTIONS

## 2024-03-13 NOTE — PROGRESS NOTES
Subjective   Patient ID: Jennifer Hdz is a 83 y.o. female who presents for evaluation of UR Sx     Her Sx began more than a week ago 3/2024.  She saw her therapist on Wednesday 3/6/2024 and she was sick prior to that  She has PND and a cough. Yesterday 3/11/2024, she was really aware of her Sx.  The rib hurts and it seems to be getting worse, the pain is in the right upper back area   The pain is close to the axillae right side   The pain comes with movement not breathing.  She was given a long steroid taper from derm due to a rash  She just finished the steroids a week ago 3/2024      HEALTH  PAP 7/05 and no longer needs to repeat   Mammo 4/14, 5/15, 8/16, 12/18, 12/19, 9/2020-, 6/2022, 7/2023, ordered 2/2024   BD 1/14 T-2.7, 1/17 T-2.2, 1/2020 T-1.8, 6/2022 T-1.3, ordered 2/2024   Colon  2006 polyps, 5/2011 polyp, 12/17 polyp so Q 1 poor prep, ordered 5/2022   EKG 12/13, 6/15, 8/17, 12/18, 10/2020, 3/2021, 4/2023 ED  Urine 12/14, 12/15, 12/17, 6/18, 12/18, 12/19   Flu 2016, 2017, 9/18, 12/19, 10/2020, 9/2021, 11/2022, 11/2023  TDAP ? and will update with injury   Pneumovax 2012 last one   Prevnar 12/15, 1/16  Zostavax 2007  Shingrix 5/1/19 and 2/7/19   Moderna CVD 2/18/2021 and 3/11/2021 booster 12/4/2021  Ophth- She is seeing someone at Dr Andres's office.       Review of Systems  All systems negative except those listed in the HPI       Objective   Visit Vitals  /80 (BP Location: Left arm, Patient Position: Sitting)   Pulse 86   Temp 36.4 °C (97.5 °F) (Temporal)    Body mass index is 29.89 kg/m².      Physical Exam  Vitals reviewed.   Constitutional:       Appearance: Normal appearance.   HENT:      Head: Normocephalic.      Right Ear: Tympanic membrane, ear canal and external ear normal.      Left Ear: Tympanic membrane, ear canal and external ear normal.      Nose: Nose normal.      Mouth/Throat:      Comments: oropharynx edematous, PND noted  Eyes:      Conjunctiva/sclera: Conjunctivae normal.    Cardiovascular:      Rate and Rhythm: Normal rate and regular rhythm.      Pulses: Normal pulses.      Heart sounds: Normal heart sounds.   Pulmonary:      Effort: Pulmonary effort is normal.      Breath sounds: Normal breath sounds.      Comments: no rubbing   Abdominal:      General: Bowel sounds are normal.      Palpations: Abdomen is soft.   Musculoskeletal:         General: Normal range of motion.      Cervical back: Normal range of motion and neck supple.      Comments: tenderness to the right upper back at the ribs   Skin:     General: Skin is warm.      Comments: raised hard lesion on the left elbow   Neurological:      General: No focal deficit present.      Mental Status: She is alert and oriented to person, place, and time.   Psychiatric:         Mood and Affect: Mood normal.         Behavior: Behavior normal.         Thought Content: Thought content normal.         Judgment: Judgment normal.       Assessment/Plan   Problem List Items Addressed This Visit       Atrophic vaginitis    Dyslipidemia    Hypertension    Hypothyroidism    Recurrent UTI     Other Visit Diagnoses       Acute non-recurrent maxillary sinusitis    -  Primary    Relevant Medications    amoxicillin-pot clavulanate (Augmentin) 875-125 mg tablet    Rib pain on right side        Relevant Orders    XR chest 2 views    XR ribs right 2 views w chest pa or ap    Acute cough        Relevant Medications    benzonatate (Tessalon) 100 mg capsule    Mixed stress and urge urinary incontinence                Follow up completed      She is  with 3 children. She has a previous history of tobacco use   Her  had dementia (not NPH) and passed in 2022.   She was  35 years   She has good support with her family and friends   Her son does her finances, he goes to her house weekly      Acute sinusitis : On exam: oropharynx edematous, PND noted, pulm exam- no rubbing noted, tenderness to the right upper back at the ribs 3/2024. Her  voice is hoarse. Her Sx began more than a week ago 3/2024.  She saw her therapist on Wednesday 3/6/2024 and she was sick prior to that  She has PND and a cough. Yesterday 3/11/2024, she was really aware of her Sx.  The rib hurts and it seems to be getting worse, the pain is in the RU back area   The pain is close to the axillae right side   The pain comes with movement not breathing.   She was given a long steroid taper from derm due to a rash  She just finished the steroids a week ago 3/2024.  Prescription sent in for Augmentin 875 mg BID for 14 days, possible side effects discussed with medication    Prescription sent in for Tessalon Perles 200 mg TID   Recommend and orders placed for CXR for further evaluation 3/2024   Encouraged rest and increased hydration with warm/ tepid fluids   She will call if Sx persist or worsen    She has a raised hard lesion on the left elbow: Concerned this is SCC.  Recommend she see dermatology 3/2024. She will call and make an appt 3/2024     She has experienced tinnitus for years.  This is not problematic for her  She went to [place] for hearing aids bilaterally     Her weight in office today is 148 with BMI of 29.89. We spent 15 minutes discussing diet and weight loss. The struggle of weight loss persists  Explained goal for BMI to be 25 or less       HTN: BP Stable   Continue losartan/HCTZ 100/12.5 mg daily   EKG 4/2023 SR rate of 87, left axis, WI interval 150, QTc 450. Frequent PACs.   Stress test 2017 was normal   Continue to monitor BP at home and call with elevated readings      I have spent 15 min face to face with this patient discussing their cardiac risk and behavioral therapies of nutrition choices and exercise. We are trying to eliminate habits that are contributing to their cardiac risk.  We agreed on a plan of how they can reduce their current CV risk   ACO score 6/6 IO 3/2024     Elevated lipids:   Explained goal for LDL to be less than 100 and ideally less than 70    Discussed diet and exercise for better control      Hypothyroid:   Continue levothyroxine 112 mcg a day.    She saw Endo but there were no recommendations that were new      Left sided TMJ:   She follows with her dentist routinely      Concerns for memory issues:   She has made plans with friends but forgets about the plans   She has not been driving and forgetting where she is going  She has no issues with leaving things running around the house   She keeps activities logged on a calendar and keeps a notebook close by   She reads a lot and does puzzle books.  She can try OTC memory supplements if she would like.      Situational Depression/ insomnia:   She is no longer seeing a therapist   Her  passed in 2022, they were  35 years.   Her stress got to a level that she disassociated when her  passed   Her son is doing her finances for her   Her KIRAN passed away from ALS in 8/18   She exercises to help relieve her stress.   She admits weight gain exacerbates her depressive Sx.   She likes to be in control of her environment   She could not tolerate Lexapro   Discontinued Remeron and Klonopin since it did not help.     Recurrent leg cramps:  She started eating potassium enriched foods and this has helped  She has good relief with yellow mustard prn     Bilateral varicose veins and spider veins right> left   Recommend wearing support stockings during the day   Offered patient appt with a vascular surgeon- she declines for now 11/2023      Recurrent UTI- Myrbetriq is costly until she meets her deductible. She has a 2 month supply at home.   UTI 9/20/2021, 10/4/2021 and 10/12/2021, 7/2023.   Continue Macrobid 50 mg daily and D- Mannose daily. Refilled Macrobid 2/2024  Continue Pyridium to use prn and Estrace cream QOD   Continue Myrbetriq 50 mg daily for OAB   CT urogram and cystoscopy in the past with Dr Jonah DICKSON 7/2023 normal. Urine cultures 7/2023 negative   She saw BERT Sandoval in 7/2023        Back pain and cervicalgia:    She had nerve blocks on 1/21/2022 with Dr Espinoza.   She has tried many different modalities to control pain, they have all failed   MRI L/S 12/2021 severe spinal stenosis lower spine, worse on left side.   MRI of C/S 2/2022 showed multilevel degenerative changes of the cervical spine. Central canal stenosis is most pronounced at C5- 6. There is multilevel neuroforaminal narrowing due to facet and uncovertebral joint hypertrophy  EMG  2/2024 showed chronic CAT- 1 radiculopathy and carpal tunnel bilat   - S/p back surgery on 3 vertebrae (L3,4, and L5) with Dr Rodriguez 4/21/2022.   Continue gabapentin 300 mg 1 tablet QHS and tizanidine prn.    She is no longer taking Tramadol      Left hip /arthritis issues:   She saw ortho 7/2022 and had a nerve block but it did not help   S/p LTH anterior approach 10/5/2022 with Dr Perla   She had a bursa injection and it did not help 2022  She saw a different ortho physician and had injection to the bursa 12/2022   She saw Dr Kaye in 11/2023 and had left hip bursa injection      Right shoulder complete reversal on 10/5/2020:   She saw Dr Meza for a second opinion and told she fractured her acromion   She was told there is nothing more that can be done for her right shoulder   She completed PT and doing the exercises at home      Bilateral carpal tunnel: Reviewed results of EMG studies with patient 2/2024  EMG  2/2024 studies showed chronic CAT- 1 radiculopathy and carpal tunnel bilat  Recommend she get wrist braces bilaterally for carpal tunnel to wear at night only      Recurrent Cold sores:  She has cold sores because of stress. She states she has a history of cold sores.  She is using L-lysine and Herpecin for cold sores  Continue Valtrex 1 GM to take as directed with onset of cold sore      Vitamin D def: Levels 36 on labs in 6/2022  Continue OTC Vitamin D 2000 UT daily      Dermatology: she has multiple senile keratosis, nothing suspicious on  exam 2/2024  She had BCC removed from her forehead on 2/11/2022  She had BCC removed on 2/18/2022  Continue following with dermatology      Pap no longer needed   Mammo at [facility] genet in 7/2023 was negative and ordered 2/2024   Breast exam normal 2/2024      BD 6/2022 T-1.3. Continue OTC Ca 600 mg BID, OTC Vitamin D 2000 UT daily and eat 2 servings of calcium enriched foods daily. Discussed importance of weight bearing exercises. FRAX score was only 14.   Reclast last injection in 12/17.     Colonoscopy 12/17 +polyp and orders placed 5/2022 but not done. She will call GI and make an appt to see if she needs to do the colonoscopy again due to age 2/2024     Ophth:  She is seeing someone at Dr Andres's office, she was seen in 2019.   She goes Q 2 years for Rx check.   She will have her next eye exam results faxed to my office in order to update her medical records.      Her daughter Lizeth is her MPOA.   Copy of her Advanced Directives scanned in her chart 5/2023        Flu 2016, 2017, 9/18, 12/19, 10/2020, 9/2021, 11/2022, 11/2023   TDAP ? and will update with injury   Pneumovax 2012 last one   Prevnar 12/15, 1/16  Zostavax 2007  Shingrix 5/1/19 and 2/7/19   Moderna CVD vaccine 2/18/2021 and 3/11/2021 booster 12/4/2021     Some elements in the chart were copied from Dr. Azul's last office visit with patient. Notes have been updated where appropriate, and reflect my current medical decision making from today.     Return in 6 months for follow up or sooner if needed    (MCR due 2/2025)      Scribe Attestation  By signing my name below, I, Amy Greer , Scribe   attest that this documentation has been prepared under the direction and in the presence of Darlene Azul MD.

## 2024-04-03 DIAGNOSIS — K21.00 GASTROESOPHAGEAL REFLUX DISEASE WITH ESOPHAGITIS WITHOUT HEMORRHAGE: Primary | ICD-10-CM

## 2024-04-03 DIAGNOSIS — I10 PRIMARY HYPERTENSION: ICD-10-CM

## 2024-04-03 RX ORDER — OMEPRAZOLE 40 MG/1
40 CAPSULE, DELAYED RELEASE ORAL DAILY
Qty: 90 CAPSULE | Refills: 3 | Status: SHIPPED | OUTPATIENT
Start: 2024-04-03 | End: 2024-05-29 | Stop reason: ALTCHOICE

## 2024-04-03 RX ORDER — LOSARTAN POTASSIUM AND HYDROCHLOROTHIAZIDE 12.5; 1 MG/1; MG/1
1 TABLET ORAL DAILY
Qty: 90 TABLET | Refills: 3 | Status: SHIPPED | OUTPATIENT
Start: 2024-04-03

## 2024-05-08 DIAGNOSIS — E03.9 HYPOTHYROIDISM, UNSPECIFIED TYPE: ICD-10-CM

## 2024-05-08 RX ORDER — LEVOTHYROXINE SODIUM 112 UG/1
112 TABLET ORAL DAILY
Qty: 90 TABLET | Refills: 3 | Status: SHIPPED | OUTPATIENT
Start: 2024-05-08

## 2024-05-29 ENCOUNTER — OFFICE VISIT (OUTPATIENT)
Dept: PRIMARY CARE | Facility: CLINIC | Age: 84
End: 2024-05-29
Payer: MEDICARE

## 2024-05-29 VITALS
HEART RATE: 65 BPM | BODY MASS INDEX: 30.44 KG/M2 | HEIGHT: 59 IN | TEMPERATURE: 97.6 F | SYSTOLIC BLOOD PRESSURE: 122 MMHG | WEIGHT: 151 LBS | DIASTOLIC BLOOD PRESSURE: 70 MMHG | OXYGEN SATURATION: 98 %

## 2024-05-29 DIAGNOSIS — N39.0 RECURRENT UTI: ICD-10-CM

## 2024-05-29 DIAGNOSIS — E03.9 HYPOTHYROIDISM, UNSPECIFIED TYPE: ICD-10-CM

## 2024-05-29 DIAGNOSIS — G56.03 CARPAL TUNNEL SYNDROME, BILATERAL: Primary | ICD-10-CM

## 2024-05-29 DIAGNOSIS — M54.12 CERVICAL RADICULAR PAIN: ICD-10-CM

## 2024-05-29 DIAGNOSIS — I10 PRIMARY HYPERTENSION: ICD-10-CM

## 2024-05-29 PROBLEM — M81.0 OSTEOPOROSIS: Status: RESOLVED | Noted: 2023-03-28 | Resolved: 2024-05-29

## 2024-05-29 PROCEDURE — 1160F RVW MEDS BY RX/DR IN RCRD: CPT | Performed by: INTERNAL MEDICINE

## 2024-05-29 PROCEDURE — 1157F ADVNC CARE PLAN IN RCRD: CPT | Performed by: INTERNAL MEDICINE

## 2024-05-29 PROCEDURE — 3078F DIAST BP <80 MM HG: CPT | Performed by: INTERNAL MEDICINE

## 2024-05-29 PROCEDURE — G2211 COMPLEX E/M VISIT ADD ON: HCPCS | Performed by: INTERNAL MEDICINE

## 2024-05-29 PROCEDURE — 1123F ACP DISCUSS/DSCN MKR DOCD: CPT | Performed by: INTERNAL MEDICINE

## 2024-05-29 PROCEDURE — 1036F TOBACCO NON-USER: CPT | Performed by: INTERNAL MEDICINE

## 2024-05-29 PROCEDURE — 3074F SYST BP LT 130 MM HG: CPT | Performed by: INTERNAL MEDICINE

## 2024-05-29 PROCEDURE — 1159F MED LIST DOCD IN RCRD: CPT | Performed by: INTERNAL MEDICINE

## 2024-05-29 PROCEDURE — 99214 OFFICE O/P EST MOD 30 MIN: CPT | Performed by: INTERNAL MEDICINE

## 2024-05-29 RX ORDER — METHYLPREDNISOLONE 4 MG/1
TABLET ORAL
Qty: 21 TABLET | Refills: 0 | Status: SHIPPED | OUTPATIENT
Start: 2024-05-29 | End: 2024-06-05

## 2024-05-29 ASSESSMENT — PATIENT HEALTH QUESTIONNAIRE - PHQ9
SUM OF ALL RESPONSES TO PHQ9 QUESTIONS 1 AND 2: 0
2. FEELING DOWN, DEPRESSED OR HOPELESS: NOT AT ALL
1. LITTLE INTEREST OR PLEASURE IN DOING THINGS: NOT AT ALL

## 2024-05-29 NOTE — PROGRESS NOTES
Subjective   Patient ID: Jennifer Hdz is a 83 y.o. female who presents for evaluation for right wrist pain      She complains of right wrist pain and numbness   The left wrist is painful also but the right seems worse  She has paresthesia in her hands and numbness intermittently   She has difficulty performing her daily activities due to her Sx   She purchased 2 wrist braces but she takes them off in the middle of the night while asleep     There are times she has difficulty remembering words      She is not taking D- mannose daily   She stopped omeprazole     HEALTH  PAP 7/05 and no longer needs to repeat   Mammo 4/14, 5/15, 8/16, 12/18, 12/19, 9/20, 6/22, 7/23, ordered 2/2024   BD 1/14 T-2.7, 1/17 T-2.2, 1/20 T-1.8, 6/22 T-1.3, ordered 2/2024   Colon 2006 polyps, 5/11 polyp, 12/17 polyp so Q 1 poor prep, ordered 5/2022   EKG 12/13, 6/15, 8/17, 12/18, 10/20, 3/21, 4/23 ED  Urine 12/14, 12/15, 12/17, 6/18, 12/18, 12/19   Flu  2017, 9/18, 12/19, 10/20, 9/21, 11/22, 11/23  TDAP ? and will update with injury   Pneumovax 2012 last one   Prevnar 12/15, 1/16  Zostavax 2007  Shingrix 5/1/19 and 2/7/19   Moderna CVD 2/18/2021 and 3/11/2021 booster 12/4/2021  Ophth- She is seeing someone at Dr Andres's office.      Review of Systems  All systems negative except those listed in the HPI       Objective   Visit Vitals  /70 (BP Location: Left arm, Patient Position: Sitting)   Pulse 65   Temp 36.4 °C (97.6 °F) (Temporal)    Body mass index is 30.5 kg/m².      Physical Exam  Vitals reviewed.   Constitutional:       Appearance: Normal appearance. She is obese.   HENT:      Head: Normocephalic.      Right Ear: Tympanic membrane, ear canal and external ear normal.      Left Ear: Tympanic membrane, ear canal and external ear normal.      Nose: Nose normal.      Mouth/Throat:      Pharynx: Oropharynx is clear.   Eyes:      Conjunctiva/sclera: Conjunctivae normal.   Cardiovascular:      Rate and Rhythm: Normal rate and  regular rhythm.      Pulses: Normal pulses.      Heart sounds: Normal heart sounds.   Pulmonary:      Effort: Pulmonary effort is normal.      Breath sounds: Normal breath sounds.   Abdominal:      General: Bowel sounds are normal.      Palpations: Abdomen is soft.   Musculoskeletal:         General: Normal range of motion.      Cervical back: Normal range of motion and neck supple.      Comments: atrophy left hand, 5/5 symmetrical strength, arthritis in the thumbs  tenderness to the scapular areas   Skin:     General: Skin is warm.      Comments: she has multiple senile keratosis, nothing suspicious on exam   Neurological:      General: No focal deficit present.      Mental Status: She is alert and oriented to person, place, and time.   Psychiatric:         Mood and Affect: Mood normal.         Behavior: Behavior normal.         Thought Content: Thought content normal.         Judgment: Judgment normal.       Assessment/Plan   Problem List Items Addressed This Visit       Hypertension    Hypothyroidism    Recurrent UTI     Other Visit Diagnoses       Carpal tunnel syndrome, bilateral    -  Primary    Relevant Orders    Referral to Orthopaedic Surgery    Cervical radicular pain        Relevant Orders    Referral to Orthopaedic Surgery              Follow up completed    She is  with 3 children. She has a previous history of tobacco use   Her  had dementia (not NPH) and passed in 2022.   She was  35 years. She has good support with her family and friends   Her son does her finances, he goes to her house weekly     Bilateral carpal tunnel: On exam: On exam: atrophy left hand, 5/5 symmetrical strength, arthritis in the thumbs 5/2024. She complains of right wrist pain and numbness. The left wrist is painful also but the right seems worse.   She has paresthesia in her hands and numbness intermittently.   She has difficulty performing her daily activities due to her Sx. She purchased 2 wrist braces  but she takes them off in the middle of the night while asleep.   She is right hand dominant   EMG  2/2024 studies showed chronic CAT- 1 radiculopathy and carpal tunnel bilat  Recommend and orders placed for ortho hand for further evaluation 5/2024 - Recommend seeing Dr Mares - my  will assist her with getting this appt     She has experienced tinnitus for years.  This is not problematic for her  She went to [facility] for hearing aids bilaterally     Her weight in office is 151 with BMI of 30.50. We spent 15 minutes discussing diet and weight loss. The struggle of weight loss persists  Explained goal for BMI to be 25 or less    Recommend she look into a plant based/ whole foods diet      HTN: BP Stable   Continue losartan/HCTZ 100/12.5 mg daily   EKG 4/2023 SR rate of 87, left axis, WA interval 150, QTc 450. Frequent PACs.   Stress test 2017 was normal   Continue to monitor BP at home and call with elevated readings      I have spent 15 min face to face with this patient discussing their cardiac risk   We discussed the patients cardiovascular risk. If needed, lifestyle modifications recommended including: behavioral therapies of nutrition choices, exercise and eliminate habits that are contributing to their cardiac risk. We agreed to a plan to decrease his cardiovascular risks. Discussed ASA. Reviewed Guidelines and approved recommendations made to patient.   The patient's 10 yr CV risk was estimated at  : ACO score 4/6 IO 5/2024      Elevated lipids:   Explained goal for LDL to be less than 100 and ideally less than 70   Recommend she look into a plant based/ whole foods diet       Hypothyroid:   Continue levothyroxine 112 mcg a day.    She saw Endo but there were no recommendations that were new      Left sided TMJ:   She follows with her dentist routinely      Concerns for memory issues: There are times she has difficulty remembering words   She has made plans with friends but forgets about the plans    She has not been driving and forgetting where she is going  She has no issues with leaving things running around the house   She keeps activities logged on a calendar and keeps a notebook close by   She reads a lot and does puzzle books.  She can try OTC memory supplements if she would like.      Situational Depression/ insomnia:   She is no longer seeing a therapist   Her  passed in 2022, they were  35 years.   Her stress got to a level that she disassociated when her  passed   Her son is doing her finances for her   Her KIRAN passed away from ALS in 8/18   She exercises to help relieve her stress.   She admits weight gain exacerbates her depressive Sx.   She likes to be in control of her environment   She could not tolerate Lexapro   Discontinued Remeron and Klonopin since it did not help.     Recurrent leg cramps:  She started eating potassium enriched foods and this has helped  She has good relief with yellow mustard prn     Bilateral varicose veins and spider veins right> left   Recommend wearing support stockings during the day   Offered patient appt with a vascular surgeon- she declines for now 11/2023      Recurrent UTI-  She has not been taking D- mammose. Recommend she restart D-mannose with cranberry daily   UTI 9/20/2021, 10/4/2021 and 10/12/2021, 7/2023.   Continue Macrobid 50 mg daily and restart D- Mannose w/ cranberry daily.    Continue Pyridium to use prn and Estrace cream QOD   Continue Myrbetriq 50 mg daily for OAB   CT urogram and cystoscopy in the past with Dr Jonah DICKSON 7/2023 normal. Urine cultures 7/2023 negative   She saw BERT Sandoval in 7/2023       Back pain and cervicalgia:  On exam: tenderness to the scapular areas 5/24  She had nerve blocks on 1/21/2022 with Dr Espinoza.   She has tried many different modalities to control pain, they have all failed   MRI L/S 12/2021 severe spinal stenosis lower spine, worse on left side.   MRI of C/S 2/2022 showed multilevel  degenerative changes of the cervical spine. Central canal stenosis is most pronounced at C5- 6. There is multilevel neuroforaminal narrowing due to facet and uncovertebral joint hypertrophy  EMG  2/2024 showed chronic CAT- 1 radiculopathy and carpal tunnel bilat   - S/p back surgery on 3 vertebrae (L3,4, and L5) with Dr Rodriguez 4/21/2022.   Continue gabapentin 300 mg 1 tablet QHS and tizanidine prn.    She is no longer taking Tramadol   Orders placed for ortho evaluation 5/2024      Left hip /arthritis issues:   She saw ortho 7/2022 and had a nerve block but it did not help   S/p LTH anterior approach 10/5/2022 with Dr Perla   She had a bursa injection and it did not help 2022  She saw a different ortho physician and had injection to the bursa 12/2022   She saw Dr Kaye in 11/2023 and had left hip bursa injection      Right shoulder complete reversal on 10/5/2020:   She saw Dr Meza for a second opinion and told she fractured her acromion   She was told there is nothing more that can be done for her right shoulder   She completed PT and doing the exercises at home     She has a raised hard lesion on the left elbow: Concerned this is SCC.  Recommend she see dermatology 3/2024. She will call and make an appt 3/2024      Recurrent Cold sores:  She has cold sores because of stress. She states she has a history of cold sores.  She is using L-lysine and Herpecin for cold sores  Continue Valtrex 1 GM to take as directed with onset of cold sore      Vitamin D def: Levels 36 on labs in 6/2022  Continue OTC Vitamin D 2000 UT daily      Dermatology: she has multiple senile keratosis, nothing suspicious on exam 5/2024  She had BCC removed from her forehead on 2/11/2022  She had BCC removed on 2/18/2022  Continue following with dermatology      Pap no longer needs to repeat    Mammo at [facility] in 7/2023 was negative and ordered 2/2024   Breast exam normal 2/2024      BD 6/2022 T-1.3. Continue OTC Ca 600 mg BID, OTC Vitamin  D 2000 UT daily and eat 2 servings of calcium enriched foods daily. Discussed importance of weight bearing exercises. FRAX score was only 14.   Reclast last injection in 12/17.     Colonoscopy 12/17 +polyp and orders placed 5/2022 but not done. She will call GI and make an appt to see if she needs to do the colonoscopy again due to age 2/2024     Ophth:  She is seeing someone at Dr Andres's office, she was seen in 2019.   She goes Q 2 years for Rx check.   She will have her next eye exam results faxed to my office in order to update her medical records.      Her daughter Lizeth is her MPOA.   Copy of her Advanced Directives scanned in her chart 5/2023        Flu  2017, 9/18, 12/19, 10/20, 9/21, 11/22, 11/23  TDAP ? and will update with injury   Pneumovax 2012 last one   Prevnar 12/15, 1/16  Zostavax 2007  Shingrix 5/1/19 and 2/7/19   Moderna CVD 2/18/2021 and 3/11/2021 booster 12/4/2021     Some elements in the chart were copied from Dr. Azul's last office visit with patient. Notes have been updated where appropriate, and reflect my current medical decision making from today.     Keep scheduled appt 8/14/2024 for follow up or sooner if needed    (MCR due 2/2025)      Scribe Attestation  By signing my name below, I Amy Greer , Scribe   attest that this documentation has been prepared under the direction and in the presence of Darlene Azul MD.

## 2024-06-06 ENCOUNTER — APPOINTMENT (OUTPATIENT)
Dept: PRIMARY CARE | Facility: CLINIC | Age: 84
End: 2024-06-06
Payer: MEDICARE

## 2024-06-12 ENCOUNTER — OFFICE VISIT (OUTPATIENT)
Dept: ORTHOPEDIC SURGERY | Facility: CLINIC | Age: 84
End: 2024-06-12
Payer: MEDICARE

## 2024-06-12 DIAGNOSIS — G56.02 CARPAL TUNNEL SYNDROME OF LEFT WRIST: ICD-10-CM

## 2024-06-12 DIAGNOSIS — G56.01 CARPAL TUNNEL SYNDROME OF RIGHT WRIST: Primary | ICD-10-CM

## 2024-06-12 PROCEDURE — 1157F ADVNC CARE PLAN IN RCRD: CPT | Performed by: ORTHOPAEDIC SURGERY

## 2024-06-12 PROCEDURE — 1160F RVW MEDS BY RX/DR IN RCRD: CPT | Performed by: ORTHOPAEDIC SURGERY

## 2024-06-12 PROCEDURE — 1036F TOBACCO NON-USER: CPT | Performed by: ORTHOPAEDIC SURGERY

## 2024-06-12 PROCEDURE — 1123F ACP DISCUSS/DSCN MKR DOCD: CPT | Performed by: ORTHOPAEDIC SURGERY

## 2024-06-12 PROCEDURE — 1159F MED LIST DOCD IN RCRD: CPT | Performed by: ORTHOPAEDIC SURGERY

## 2024-06-12 PROCEDURE — 99214 OFFICE O/P EST MOD 30 MIN: CPT | Performed by: ORTHOPAEDIC SURGERY

## 2024-06-12 PROCEDURE — 99204 OFFICE O/P NEW MOD 45 MIN: CPT | Performed by: ORTHOPAEDIC SURGERY

## 2024-06-12 NOTE — LETTER
June 17, 2024     Darlene Azul MD  [address]  [facility]  [facility] [id number]    Patient: Jennifer Hdz   YOB: 1940   Date of Visit: 6/12/2024       Dear Dr. Darlene zAul MD:    Thank you for referring Jennifer Hdz to me for evaluation. Below are my notes for this consultation.  If you have questions, please do not hesitate to call me. I look forward to following your patient along with you.       Sincerely,     Wally Mares MD      CC: No Recipients  ______________________________________________________________________________________    CHIEF COMPLAINT         Pain and tingling of both hands    ASSESSMENT + PLAN    Bilateral carpal tunnel syndrome, worse on the right, despite splinting    The nature of carpal tunnel syndrome was reviewed, along with the slowly progressive natural history.  The options for management were reviewed, including night splinting, cortisone injection, or surgical carpal tunnel release.  The major benefits and risks of surgery were specifically reviewed, as was the postoperative rehabilitation course.    The patient does want to go ahead with surgery and will contact the office to schedule an exact surgical date.  The procedure will be done under sedation and local at the location of her convenience, starting on whichever side is more symptomatic at the time, likely the right.        HISTORY OF PRESENT ILLNESS       Patient is a 83 y.o. right-hand dominant female retiree, who presents today for evaluation of tingling and pain into both hands.  This involves the radial digits.  It began about 2 years ago.  There was no single specific recalled trauma.  No popping, clicking, or instability.  Pain was initially at night and would wake her with a positive shake sign.  She obtained splints and these have helped a lot, but symptoms are now recurring.  Symptoms are worsening and last longer into the day, especially with  prolonged handwriting or holding a book.  She had an EMG, prompting today's visit.    She is hypothyroid on levothyroxine.  She is not diabetic and does not smoke.      REVIEW OF SYSTEMS       A 30-item multi-system Review Of Systems was obtained on today's intake form.  This was reviewed with the patient and is correct.  The pertinent positives and negatives are listed above.  The form has been scanned separately into the medical record.      PHYSICAL EXAM    Constitutional:    Appears stated age. Well-developed and well-nourished overweight female in no acute distress.  Psychiatric:         Pleasant normal mood and affect. Behavior is appropriate for the situation.   Head:                   Normocephalic and atraumatic.  Eyes:                    Pupils are equal and round.  Cardiovascular:  2+ radial and ulnar pulses. Fingers well-perfused.  Respiratory:        Effort normal. No respiratory distress. Speaking in complete sentences.  Neurologic:       Alert and oriented to person, place, and time.  Skin:                Skin is intact, warm and dry.  Hematologic / Lymphatic:    No lymphedema or lymphangitis.    Extremities / Musculoskeletal:                      Skin of both hands and wrists is intact with no erythema, ecchymosis, or diffuse swelling.  Normal skin drag and coloration.  Full composite finger flexion extension with good intrinsic plus minus posture.  Symmetric wrist and forearm motion.  5/5 APB and hand intrinsics with no wasting.  Positive Phalen and Durkan bilaterally, more brisk on the right.  Negative Tinel at wrist and elbow.  Negative elbow flexion test.  Cervical range of motion is good and does not reproduce chief complaint.  Sensation intact to light touch in all distributions.  Capillary refill less than 2 seconds.  Negative Kary.      IMAGING / LABS / EMGs           EMG from February 9 was reviewed and does show slowing at the median nerve at both wrists consistent with carpal tunnel  syndrome, more significant on the left.  Needle component is grossly normal.  There is also a bilateral overlay of C8-T1 radiculopathy.  I agree with reading.      Past Medical History:   Diagnosis Date   • Bunion of right foot     Bunion, right foot   • Cramp and spasm 12/23/2018    Muscle cramps   • Enthesopathy, unspecified     Bone spur   • Generalized anxiety disorder 08/30/2021    Anxiety in acute stress reaction   • Other amnesia 09/23/2021    Transient amnesia   • Other bursitis of hip, left hip 08/05/2022    Hip bursitis, left   • Other conditions influencing health status     Partial Tear Of Right Rotator Cuff Tendon   • Pain in unspecified hip 08/05/2022    Joint pain, hip   • Pain in unspecified knee 07/31/2015    Knee pain   • Personal history of other diseases of the digestive system     History of appendicitis   • Personal history of other specified conditions 06/11/2014    History of abdominal pain   • Personal history of urinary (tract) infections 12/04/2013    History of acute cystitis   • Personal history of urinary (tract) infections 06/04/2015    History of urinary tract infection   • Pleurodynia 12/02/2016    Rib pain   • Radiculopathy, site unspecified 01/19/2022    Radicular pain of right lower extremity   • Radiculopathy, site unspecified 02/16/2022    Radicular pain of left lower extremity   • Sleep related leg cramps 06/04/2020    Nocturnal leg cramps   • Synovial cyst of popliteal space (Baker), unspecified knee 07/31/2015    Baker's cyst   • Unspecified injury of thorax, initial encounter 12/02/2016    Rib injury       Medication Documentation Review Audit       Reviewed by Wally Mares MD (Physician) on 06/17/24 at 0919      Medication Order Taking? Sig Documenting Provider Last Dose Status   d-mannose 500 mg capsule [id number] No Take 1 capsule by mouth in the morning and 1 capsule at noon and 1 capsule in the evening and 1 capsule before bedtime. Take with cranberry. Historical  Provider, MD Taking Active   estradiol (Estrace) 0.01 % (0.1 mg/gram) vaginal cream [id number] No insert  pea size amount to vaginal area every other day Darlene Azul MD Taking Active   levothyroxine (Synthroid, Levoxyl) 112 mcg tablet [id number] No Take 1 tablet (112 mcg) by mouth once daily. as directed Darlene Azul MD Taking Active   losartan-hydrochlorothiazide (Hyzaar) 100-12.5 mg tablet [id number] No Take 1 tablet by mouth once daily. Darlene Azul MD Taking Active   valACYclovir (Valtrex) 1 gram tablet [id number] No Take 2 tablets (2,000 mg) by mouth. Take at first sign of eruption, then 2 tablets in 12 hours and then 1 po tid for another 1-2 days as needed Historical Provider, MD Taking Active                    Allergies   Allergen Reactions   • Codeine Other   • Escitalopram Unknown   • Hydromorphone Unknown   • Hydroxyzine Unknown   • Morphine Other   • Oxycodone-Acetaminophen Unknown       Social History     Socioeconomic History   • Marital status:      Spouse name: Not on file   • Number of children: Not on file   • Years of education: Not on file   • Highest education level: Not on file   Occupational History   • Not on file   Tobacco Use   • Smoking status: Former     Types: Cigarettes   • Smokeless tobacco: Never   Vaping Use   • Vaping status: Never Used   Substance and Sexual Activity   • Alcohol use: Yes     Comment: occasional   • Drug use: Never   • Sexual activity: Not on file   Other Topics Concern   • Not on file   Social History Narrative   • Not on file     Social Determinants of Health     Financial Resource Strain: Not on file   Food Insecurity: Not on file   Transportation Needs: Not on file   Physical Activity: Not on file   Stress: Not on file   Social Connections: Not on file   Intimate Partner Violence: Not on file   Housing Stability: Not on file       Past Surgical History:   Procedure Laterality Date   • OTHER SURGICAL HISTORY  02/16/2022    Excision of basal cell  carcinoma   • SHOULDER SURGERY  03/25/2021    Shoulder Surgery Right     SURGICAL INDICATION    I reviewed the options for further management of this condition and the likely success rates of each.  The patient feels that they have maximized the benefits of conservative care, and they do want to go on to surgery.    I reviewed the major risks of surgery including infection; scarring; damage to nerves, tendons, or vessels; stiffness; failure to relieve symptoms, recurrent symptoms, pillar pain, and wound healing problems, as well as anesthesia risks.  I answered their questions to their satisfaction.  They were given my contact information and will contact the office when they are ready to schedule an exact surgical date.  Surgery will be posted as follows :    Dx :          Right carpal tunnel syndrome  ICD-10 :      G56.01  Procedure :     Right carpal tunnel release  (may change sides)  CPT :        77375  Anesth :    MAC  Location :   [facility]  Duration :    45 minutes  Specials :    None  PAT :       No  Post-Op Visit :    10-15 days      Electronically Signed      KARMA Mares MD      Orthopaedic Hand Surgery      [phone]

## 2024-06-17 PROBLEM — G56.02 CARPAL TUNNEL SYNDROME OF LEFT WRIST: Status: ACTIVE | Noted: 2024-06-17

## 2024-06-17 PROBLEM — G56.01 CARPAL TUNNEL SYNDROME OF RIGHT WRIST: Status: ACTIVE | Noted: 2024-06-17

## 2024-06-17 NOTE — PROGRESS NOTES
CHIEF COMPLAINT         Pain and tingling of both hands    ASSESSMENT + PLAN    Bilateral carpal tunnel syndrome, worse on the right, despite splinting    The nature of carpal tunnel syndrome was reviewed, along with the slowly progressive natural history.  The options for management were reviewed, including night splinting, cortisone injection, or surgical carpal tunnel release.  The major benefits and risks of surgery were specifically reviewed, as was the postoperative rehabilitation course.    The patient does want to go ahead with surgery and will contact the office to schedule an exact surgical date.  The procedure will be done under sedation and local at the location of her convenience, starting on whichever side is more symptomatic at the time, likely the right.        HISTORY OF PRESENT ILLNESS       Patient is a 83 y.o. right-hand dominant female retiree, who presents today for evaluation of tingling and pain into both hands.  This involves the radial digits.  It began about 2 years ago.  There was no single specific recalled trauma.  No popping, clicking, or instability.  Pain was initially at night and would wake her with a positive shake sign.  She obtained splints and these have helped a lot, but symptoms are now recurring.  Symptoms are worsening and last longer into the day, especially with prolonged handwriting or holding a book.  She had an EMG, prompting today's visit.    She is hypothyroid on levothyroxine.  She is not diabetic and does not smoke.      REVIEW OF SYSTEMS       A 30-item multi-system Review Of Systems was obtained on today's intake form.  This was reviewed with the patient and is correct.  The pertinent positives and negatives are listed above.  The form has been scanned separately into the medical record.      PHYSICAL EXAM    Constitutional:    Appears stated age. Well-developed and well-nourished overweight female in no acute distress.  Psychiatric:         Pleasant normal mood  and affect. Behavior is appropriate for the situation.   Head:                   Normocephalic and atraumatic.  Eyes:                    Pupils are equal and round.  Cardiovascular:  2+ radial and ulnar pulses. Fingers well-perfused.  Respiratory:        Effort normal. No respiratory distress. Speaking in complete sentences.  Neurologic:       Alert and oriented to person, place, and time.  Skin:                Skin is intact, warm and dry.  Hematologic / Lymphatic:    No lymphedema or lymphangitis.    Extremities / Musculoskeletal:                      Skin of both hands and wrists is intact with no erythema, ecchymosis, or diffuse swelling.  Normal skin drag and coloration.  Full composite finger flexion extension with good intrinsic plus minus posture.  Symmetric wrist and forearm motion.  5/5 APB and hand intrinsics with no wasting.  Positive Phalen and Durkan bilaterally, more brisk on the right.  Negative Tinel at wrist and elbow.  Negative elbow flexion test.  Cervical range of motion is good and does not reproduce chief complaint.  Sensation intact to light touch in all distributions.  Capillary refill less than 2 seconds.  Negative Kary.      IMAGING / LABS / EMGs           EMG from February 9 was reviewed and does show slowing at the median nerve at both wrists consistent with carpal tunnel syndrome, more significant on the left.  Needle component is grossly normal.  There is also a bilateral overlay of C8-T1 radiculopathy.  I agree with reading.      Past Medical History:   Diagnosis Date    Bunion of right foot     Bunion, right foot    Cramp and spasm 12/23/2018    Muscle cramps    Enthesopathy, unspecified     Bone spur    Generalized anxiety disorder 08/30/2021    Anxiety in acute stress reaction    Other amnesia 09/23/2021    Transient amnesia    Other bursitis of hip, left hip 08/05/2022    Hip bursitis, left    Other conditions influencing health status     Partial Tear Of Right Rotator Cuff  Tendon    Pain in unspecified hip 08/05/2022    Joint pain, hip    Pain in unspecified knee 07/31/2015    Knee pain    Personal history of other diseases of the digestive system     History of appendicitis    Personal history of other specified conditions 06/11/2014    History of abdominal pain    Personal history of urinary (tract) infections 12/04/2013    History of acute cystitis    Personal history of urinary (tract) infections 06/04/2015    History of urinary tract infection    Pleurodynia 12/02/2016    Rib pain    Radiculopathy, site unspecified 01/19/2022    Radicular pain of right lower extremity    Radiculopathy, site unspecified 02/16/2022    Radicular pain of left lower extremity    Sleep related leg cramps 06/04/2020    Nocturnal leg cramps    Synovial cyst of popliteal space (Baker), unspecified knee 07/31/2015    Baker's cyst    Unspecified injury of thorax, initial encounter 12/02/2016    Rib injury       Medication Documentation Review Audit       Reviewed by Wally Mares MD (Physician) on 06/17/24 at 0919      Medication Order Taking? Sig Documenting Provider Last Dose Status   d-mannose 500 mg capsule [id number] No Take 1 capsule by mouth in the morning and 1 capsule at noon and 1 capsule in the evening and 1 capsule before bedtime. Take with cranberry. Historical Provider, MD Taking Active   estradiol (Estrace) 0.01 % (0.1 mg/gram) vaginal cream [id number] No insert  pea size amount to vaginal area every other day Darlene Azul MD Taking Active   levothyroxine (Synthroid, Levoxyl) 112 mcg tablet [id number] No Take 1 tablet (112 mcg) by mouth once daily. as directed Darlene Azul MD Taking Active   losartan-hydrochlorothiazide (Hyzaar) 100-12.5 mg tablet [id number] No Take 1 tablet by mouth once daily. Darlene Azul MD Taking Active   valACYclovir (Valtrex) 1 gram tablet [id number] No Take 2 tablets (2,000 mg) by mouth. Take at first sign of eruption, then 2 tablets in 12 hours and then 1  po tid for another 1-2 days as needed Historical Provider, MD Taking Active                    Allergies   Allergen Reactions    Codeine Other    Escitalopram Unknown    Hydromorphone Unknown    Hydroxyzine Unknown    Morphine Other    Oxycodone-Acetaminophen Unknown       Social History     Socioeconomic History    Marital status:      Spouse name: Not on file    Number of children: Not on file    Years of education: Not on file    Highest education level: Not on file   Occupational History    Not on file   Tobacco Use    Smoking status: Former     Types: Cigarettes    Smokeless tobacco: Never   Vaping Use    Vaping status: Never Used   Substance and Sexual Activity    Alcohol use: Yes     Comment: occasional    Drug use: Never    Sexual activity: Not on file   Other Topics Concern    Not on file   Social History Narrative    Not on file     Social Determinants of Health     Financial Resource Strain: Not on file   Food Insecurity: Not on file   Transportation Needs: Not on file   Physical Activity: Not on file   Stress: Not on file   Social Connections: Not on file   Intimate Partner Violence: Not on file   Housing Stability: Not on file       Past Surgical History:   Procedure Laterality Date    OTHER SURGICAL HISTORY  02/16/2022    Excision of basal cell carcinoma    SHOULDER SURGERY  03/25/2021    Shoulder Surgery Right     SURGICAL INDICATION    I reviewed the options for further management of this condition and the likely success rates of each.  The patient feels that they have maximized the benefits of conservative care, and they do want to go on to surgery.    I reviewed the major risks of surgery including infection; scarring; damage to nerves, tendons, or vessels; stiffness; failure to relieve symptoms, recurrent symptoms, pillar pain, and wound healing problems, as well as anesthesia risks.  I answered their questions to their satisfaction.  They were given my contact information and will contact  the office when they are ready to schedule an exact surgical date.  Surgery will be posted as follows :    Dx :          Right carpal tunnel syndrome  ICD-10 :      G56.01  Procedure :     Right carpal tunnel release  (may change sides)  CPT :        33246  Anesth :    MAC  [facility] :   [facility]  Duration :    45 minutes  Specials :    None  PAT :       No  Post-Op Visit :    10-15 days      Electronically Signed      KARMA Mares MD      Orthopaedic Hand Surgery      [phone]

## 2024-06-17 NOTE — H&P (VIEW-ONLY)
CHIEF COMPLAINT         Pain and tingling of both hands    ASSESSMENT + PLAN    Bilateral carpal tunnel syndrome, worse on the right, despite splinting    The nature of carpal tunnel syndrome was reviewed, along with the slowly progressive natural history.  The options for management were reviewed, including night splinting, cortisone injection, or surgical carpal tunnel release.  The major benefits and risks of surgery were specifically reviewed, as was the postoperative rehabilitation course.    The patient does want to go ahead with surgery and will contact the office to schedule an exact surgical date.  The procedure will be done under sedation and local at the location of her convenience, starting on whichever side is more symptomatic at the time, likely the right.        HISTORY OF PRESENT ILLNESS       Patient is a 83 y.o. right-hand dominant female retiree, who presents today for evaluation of tingling and pain into both hands.  This involves the radial digits.  It began about 2 years ago.  There was no single specific recalled trauma.  No popping, clicking, or instability.  Pain was initially at night and would wake her with a positive shake sign.  She obtained splints and these have helped a lot, but symptoms are now recurring.  Symptoms are worsening and last longer into the day, especially with prolonged handwriting or holding a book.  She had an EMG, prompting today's visit.    She is hypothyroid on levothyroxine.  She is not diabetic and does not smoke.      REVIEW OF SYSTEMS       A 30-item multi-system Review Of Systems was obtained on today's intake form.  This was reviewed with the patient and is correct.  The pertinent positives and negatives are listed above.  The form has been scanned separately into the medical record.      PHYSICAL EXAM    Constitutional:    Appears stated age. Well-developed and well-nourished overweight female in no acute distress.  Psychiatric:         Pleasant normal mood  and affect. Behavior is appropriate for the situation.   Head:                   Normocephalic and atraumatic.  Eyes:                    Pupils are equal and round.  Cardiovascular:  2+ radial and ulnar pulses. Fingers well-perfused.  Respiratory:        Effort normal. No respiratory distress. Speaking in complete sentences.  Neurologic:       Alert and oriented to person, place, and time.  Skin:                Skin is intact, warm and dry.  Hematologic / Lymphatic:    No lymphedema or lymphangitis.    Extremities / Musculoskeletal:                      Skin of both hands and wrists is intact with no erythema, ecchymosis, or diffuse swelling.  Normal skin drag and coloration.  Full composite finger flexion extension with good intrinsic plus minus posture.  Symmetric wrist and forearm motion.  5/5 APB and hand intrinsics with no wasting.  Positive Phalen and Durkan bilaterally, more brisk on the right.  Negative Tinel at wrist and elbow.  Negative elbow flexion test.  Cervical range of motion is good and does not reproduce chief complaint.  Sensation intact to light touch in all distributions.  Capillary refill less than 2 seconds.  Negative Kary.      IMAGING / LABS / EMGs           EMG from February 9 was reviewed and does show slowing at the median nerve at both wrists consistent with carpal tunnel syndrome, more significant on the left.  Needle component is grossly normal.  There is also a bilateral overlay of C8-T1 radiculopathy.  I agree with reading.      Past Medical History:   Diagnosis Date    Bunion of right foot     Bunion, right foot    Cramp and spasm 12/23/2018    Muscle cramps    Enthesopathy, unspecified     Bone spur    Generalized anxiety disorder 08/30/2021    Anxiety in acute stress reaction    Other amnesia 09/23/2021    Transient amnesia    Other bursitis of hip, left hip 08/05/2022    Hip bursitis, left    Other conditions influencing health status     Partial Tear Of Right Rotator Cuff  Tendon    Pain in unspecified hip 08/05/2022    Joint pain, hip    Pain in unspecified knee 07/31/2015    Knee pain    Personal history of other diseases of the digestive system     History of appendicitis    Personal history of other specified conditions 06/11/2014    History of abdominal pain    Personal history of urinary (tract) infections 12/04/2013    History of acute cystitis    Personal history of urinary (tract) infections 06/04/2015    History of urinary tract infection    Pleurodynia 12/02/2016    Rib pain    Radiculopathy, site unspecified 01/19/2022    Radicular pain of right lower extremity    Radiculopathy, site unspecified 02/16/2022    Radicular pain of left lower extremity    Sleep related leg cramps 06/04/2020    Nocturnal leg cramps    Synovial cyst of popliteal space (Baker), unspecified knee 07/31/2015    Baker's cyst    Unspecified injury of thorax, initial encounter 12/02/2016    Rib injury       Medication Documentation Review Audit       Reviewed by Wally Mares MD (Physician) on 06/17/24 at 0919      Medication Order Taking? Sig Documenting Provider Last Dose Status   d-mannose 500 mg capsule [id number] No Take 1 capsule by mouth in the morning and 1 capsule at noon and 1 capsule in the evening and 1 capsule before bedtime. Take with cranberry. Historical Provider, MD Taking Active   estradiol (Estrace) 0.01 % (0.1 mg/gram) vaginal cream [id number] No insert  pea size amount to vaginal area every other day Darlene Azul MD Taking Active   levothyroxine (Synthroid, Levoxyl) 112 mcg tablet [id number] No Take 1 tablet (112 mcg) by mouth once daily. as directed Darlene Azul MD Taking Active   losartan-hydrochlorothiazide (Hyzaar) 100-12.5 mg tablet [id number] No Take 1 tablet by mouth once daily. Darlene Azul MD Taking Active   valACYclovir (Valtrex) 1 gram tablet [id number] No Take 2 tablets (2,000 mg) by mouth. Take at first sign of eruption, then 2 tablets in 12 hours and then 1  po tid for another 1-2 days as needed Historical Provider, MD Taking Active                    Allergies   Allergen Reactions    Codeine Other    Escitalopram Unknown    Hydromorphone Unknown    Hydroxyzine Unknown    Morphine Other    Oxycodone-Acetaminophen Unknown       Social History     Socioeconomic History    Marital status:      Spouse name: Not on file    Number of children: Not on file    Years of education: Not on file    Highest education level: Not on file   Occupational History    Not on file   Tobacco Use    Smoking status: Former     Types: Cigarettes    Smokeless tobacco: Never   Vaping Use    Vaping status: Never Used   Substance and Sexual Activity    Alcohol use: Yes     Comment: occasional    Drug use: Never    Sexual activity: Not on file   Other Topics Concern    Not on file   Social History Narrative    Not on file     Social Determinants of Health     Financial Resource Strain: Not on file   Food Insecurity: Not on file   Transportation Needs: Not on file   Physical Activity: Not on file   Stress: Not on file   Social Connections: Not on file   Intimate Partner Violence: Not on file   Housing Stability: Not on file       Past Surgical History:   Procedure Laterality Date    OTHER SURGICAL HISTORY  02/16/2022    Excision of basal cell carcinoma    SHOULDER SURGERY  03/25/2021    Shoulder Surgery Right     SURGICAL INDICATION    I reviewed the options for further management of this condition and the likely success rates of each.  The patient feels that they have maximized the benefits of conservative care, and they do want to go on to surgery.    I reviewed the major risks of surgery including infection; scarring; damage to nerves, tendons, or vessels; stiffness; failure to relieve symptoms, recurrent symptoms, pillar pain, and wound healing problems, as well as anesthesia risks.  I answered their questions to their satisfaction.  They were given my contact information and will contact  the office when they are ready to schedule an exact surgical date.  Surgery will be posted as follows :    Dx :          Right carpal tunnel syndrome  ICD-10 :      G56.01  Procedure :     Right carpal tunnel release  (may change sides)  CPT :        80665  Anesth :    MAC  Location :   [facility]  Duration :    45 minutes  Specials :    None  PAT :       No  Post-Op Visit :    10-15 days      Electronically Signed      KARMA Mares MD      Orthopaedic Hand Surgery      [phone]

## 2024-06-19 DIAGNOSIS — G56.01 CARPAL TUNNEL SYNDROME ON RIGHT: ICD-10-CM

## 2024-06-20 PROBLEM — G56.01 CARPAL TUNNEL SYNDROME ON RIGHT: Status: ACTIVE | Noted: 2024-06-19

## 2024-06-20 RX ORDER — OMEPRAZOLE 40 MG/1
40 CAPSULE, DELAYED RELEASE ORAL
COMMUNITY

## 2024-06-23 RX ORDER — CEFAZOLIN SODIUM 2 G/100ML
2 INJECTION, SOLUTION INTRAVENOUS ONCE
Status: CANCELLED | OUTPATIENT
Start: 2024-06-23 | End: 2024-06-23

## 2024-06-24 ENCOUNTER — ANESTHESIA EVENT (OUTPATIENT)
Dept: OPERATING ROOM | Facility: CLINIC | Age: 84
End: 2024-06-24
Payer: MEDICARE

## 2024-06-24 ENCOUNTER — HOSPITAL ENCOUNTER (OUTPATIENT)
Facility: CLINIC | Age: 84
Setting detail: OUTPATIENT SURGERY
Discharge: HOME | End: 2024-06-24
Attending: ORTHOPAEDIC SURGERY | Admitting: ORTHOPAEDIC SURGERY
Payer: MEDICARE

## 2024-06-24 ENCOUNTER — ANESTHESIA (OUTPATIENT)
Dept: OPERATING ROOM | Facility: CLINIC | Age: 84
End: 2024-06-24
Payer: MEDICARE

## 2024-06-24 VITALS
OXYGEN SATURATION: 98 % | BODY MASS INDEX: 29.43 KG/M2 | DIASTOLIC BLOOD PRESSURE: 87 MMHG | HEIGHT: 60 IN | TEMPERATURE: 97.3 F | SYSTOLIC BLOOD PRESSURE: 187 MMHG | WEIGHT: 149.91 LBS | HEART RATE: 68 BPM | RESPIRATION RATE: 16 BRPM

## 2024-06-24 DIAGNOSIS — G89.18 ACUTE POSTOPERATIVE PAIN: Primary | ICD-10-CM

## 2024-06-24 DIAGNOSIS — G56.01 CARPAL TUNNEL SYNDROME ON RIGHT: ICD-10-CM

## 2024-06-24 PROCEDURE — 7100000009 HC PHASE TWO TIME - INITIAL BASE CHARGE: Performed by: ORTHOPAEDIC SURGERY

## 2024-06-24 PROCEDURE — 64721 CARPAL TUNNEL SURGERY: CPT | Performed by: ORTHOPAEDIC SURGERY

## 2024-06-24 PROCEDURE — 2500000004 HC RX 250 GENERAL PHARMACY W/ HCPCS (ALT 636 FOR OP/ED): Performed by: NURSE ANESTHETIST, CERTIFIED REGISTERED

## 2024-06-24 PROCEDURE — 3600000008 HC OR TIME - EACH INCREMENTAL 1 MINUTE - PROCEDURE LEVEL THREE: Performed by: ORTHOPAEDIC SURGERY

## 2024-06-24 PROCEDURE — 3600000003 HC OR TIME - INITIAL BASE CHARGE - PROCEDURE LEVEL THREE: Performed by: ORTHOPAEDIC SURGERY

## 2024-06-24 PROCEDURE — 99100 ANES PT EXTEME AGE<1 YR&>70: CPT | Performed by: ANESTHESIOLOGY

## 2024-06-24 PROCEDURE — 7100000010 HC PHASE TWO TIME - EACH INCREMENTAL 1 MINUTE: Performed by: ORTHOPAEDIC SURGERY

## 2024-06-24 PROCEDURE — 2500000004 HC RX 250 GENERAL PHARMACY W/ HCPCS (ALT 636 FOR OP/ED): Mod: JG | Performed by: ORTHOPAEDIC SURGERY

## 2024-06-24 PROCEDURE — 2500000005 HC RX 250 GENERAL PHARMACY W/O HCPCS: Performed by: ORTHOPAEDIC SURGERY

## 2024-06-24 PROCEDURE — 3700000002 HC GENERAL ANESTHESIA TIME - EACH INCREMENTAL 1 MINUTE: Performed by: ORTHOPAEDIC SURGERY

## 2024-06-24 PROCEDURE — 3700000001 HC GENERAL ANESTHESIA TIME - INITIAL BASE CHARGE: Performed by: ORTHOPAEDIC SURGERY

## 2024-06-24 PROCEDURE — 2500000005 HC RX 250 GENERAL PHARMACY W/O HCPCS: Performed by: NURSE ANESTHETIST, CERTIFIED REGISTERED

## 2024-06-24 PROCEDURE — A64721 PR REVISE MEDIAN N/CARPAL TUNNEL SURG: Performed by: ANESTHESIOLOGY

## 2024-06-24 PROCEDURE — A64721 PR REVISE MEDIAN N/CARPAL TUNNEL SURG: Performed by: NURSE ANESTHETIST, CERTIFIED REGISTERED

## 2024-06-24 RX ORDER — CEFAZOLIN 1 G/1
INJECTION, POWDER, FOR SOLUTION INTRAVENOUS AS NEEDED
Status: DISCONTINUED | OUTPATIENT
Start: 2024-06-24 | End: 2024-06-24

## 2024-06-24 RX ORDER — ACETAMINOPHEN 325 MG/1
TABLET ORAL AS NEEDED
Status: DISCONTINUED | OUTPATIENT
Start: 2024-06-24 | End: 2024-06-24

## 2024-06-24 RX ORDER — ONDANSETRON HYDROCHLORIDE 2 MG/ML
INJECTION, SOLUTION INTRAVENOUS AS NEEDED
Status: DISCONTINUED | OUTPATIENT
Start: 2024-06-24 | End: 2024-06-24

## 2024-06-24 RX ORDER — IBUPROFEN 400 MG/1
400 TABLET ORAL EVERY 6 HOURS PRN
Status: DISCONTINUED | OUTPATIENT
Start: 2024-06-24 | End: 2024-06-24 | Stop reason: HOSPADM

## 2024-06-24 RX ORDER — SODIUM CHLORIDE, SODIUM LACTATE, POTASSIUM CHLORIDE, CALCIUM CHLORIDE 600; 310; 30; 20 MG/100ML; MG/100ML; MG/100ML; MG/100ML
INJECTION, SOLUTION INTRAVENOUS CONTINUOUS PRN
Status: DISCONTINUED | OUTPATIENT
Start: 2024-06-24 | End: 2024-06-24

## 2024-06-24 RX ORDER — SODIUM CHLORIDE, SODIUM LACTATE, POTASSIUM CHLORIDE, CALCIUM CHLORIDE 600; 310; 30; 20 MG/100ML; MG/100ML; MG/100ML; MG/100ML
100 INJECTION, SOLUTION INTRAVENOUS CONTINUOUS
Status: DISCONTINUED | OUTPATIENT
Start: 2024-06-24 | End: 2024-06-24 | Stop reason: HOSPADM

## 2024-06-24 RX ORDER — LIDOCAINE HYDROCHLORIDE 10 MG/ML
INJECTION INFILTRATION; PERINEURAL AS NEEDED
Status: DISCONTINUED | OUTPATIENT
Start: 2024-06-24 | End: 2024-06-24 | Stop reason: HOSPADM

## 2024-06-24 RX ORDER — ERGOCALCIFEROL (VITAMIN D2) 200 MCG/ML
DROPS ORAL DAILY
COMMUNITY

## 2024-06-24 RX ORDER — FENTANYL CITRATE 50 UG/ML
INJECTION, SOLUTION INTRAMUSCULAR; INTRAVENOUS AS NEEDED
Status: DISCONTINUED | OUTPATIENT
Start: 2024-06-24 | End: 2024-06-24

## 2024-06-24 RX ORDER — PROPOFOL 10 MG/ML
INJECTION, EMULSION INTRAVENOUS AS NEEDED
Status: DISCONTINUED | OUTPATIENT
Start: 2024-06-24 | End: 2024-06-24

## 2024-06-24 RX ORDER — BUPIVACAINE HYDROCHLORIDE 5 MG/ML
INJECTION, SOLUTION EPIDURAL; INTRACAUDAL AS NEEDED
Status: DISCONTINUED | OUTPATIENT
Start: 2024-06-24 | End: 2024-06-24 | Stop reason: HOSPADM

## 2024-06-24 RX ORDER — LIDOCAINE IN NACL,ISO-OSMOT/PF 30 MG/3 ML
0.1 SYRINGE (ML) INJECTION ONCE
Status: DISCONTINUED | OUTPATIENT
Start: 2024-06-24 | End: 2024-06-24 | Stop reason: HOSPADM

## 2024-06-24 RX ORDER — LIDOCAINE HYDROCHLORIDE 20 MG/ML
INJECTION, SOLUTION INFILTRATION; PERINEURAL AS NEEDED
Status: DISCONTINUED | OUTPATIENT
Start: 2024-06-24 | End: 2024-06-24

## 2024-06-24 RX ORDER — ONDANSETRON HYDROCHLORIDE 2 MG/ML
4 INJECTION, SOLUTION INTRAVENOUS ONCE AS NEEDED
Status: DISCONTINUED | OUTPATIENT
Start: 2024-06-24 | End: 2024-06-24 | Stop reason: HOSPADM

## 2024-06-24 RX ORDER — ALBUTEROL SULFATE 0.83 MG/ML
2.5 SOLUTION RESPIRATORY (INHALATION) ONCE AS NEEDED
Status: DISCONTINUED | OUTPATIENT
Start: 2024-06-24 | End: 2024-06-24 | Stop reason: HOSPADM

## 2024-06-24 RX ORDER — HYDROCODONE BITARTRATE AND ACETAMINOPHEN 5; 325 MG/1; MG/1
1 TABLET ORAL EVERY 6 HOURS PRN
Qty: 5 TABLET | Refills: 0 | Status: SHIPPED | OUTPATIENT
Start: 2024-06-24 | End: 2024-06-27

## 2024-06-24 SDOH — HEALTH STABILITY: MENTAL HEALTH: CURRENT SMOKER: 0

## 2024-06-24 ASSESSMENT — PAIN - FUNCTIONAL ASSESSMENT
PAIN_FUNCTIONAL_ASSESSMENT: 0-10

## 2024-06-24 ASSESSMENT — PAIN SCALES - GENERAL
PAIN_LEVEL: 0
PAINLEVEL_OUTOF10: 0 - NO PAIN
PAINLEVEL_OUTOF10: 0 - NO PAIN
PAINLEVEL_OUTOF10: 4
PAINLEVEL_OUTOF10: 0 - NO PAIN

## 2024-06-24 ASSESSMENT — COLUMBIA-SUICIDE SEVERITY RATING SCALE - C-SSRS
2. HAVE YOU ACTUALLY HAD ANY THOUGHTS OF KILLING YOURSELF?: NO
1. IN THE PAST MONTH, HAVE YOU WISHED YOU WERE DEAD OR WISHED YOU COULD GO TO SLEEP AND NOT WAKE UP?: NO
6. HAVE YOU EVER DONE ANYTHING, STARTED TO DO ANYTHING, OR PREPARED TO DO ANYTHING TO END YOUR LIFE?: NO

## 2024-06-24 ASSESSMENT — PAIN DESCRIPTION - DESCRIPTORS: DESCRIPTORS: ACHING

## 2024-06-24 NOTE — ANESTHESIA POSTPROCEDURE EVALUATION
Patient: Jennifer Hdz    Procedure Summary       Date: 06/24/24 Room / Location: [facility] OR 02 / [facility] OR    Anesthesia Start: 1133 Anesthesia Stop: 1215    Procedure: Right Carpal Tunnel Release (Right: Hand) Diagnosis:       Carpal tunnel syndrome on right      (Carpal tunnel syndrome on right [G56.01])    Surgeons: Wally Mares MD Responsible Provider: Charlie Blanton MD    Anesthesia Type: MAC ASA Status: 2            Anesthesia Type: MAC    Vitals Value Taken Time   /100 06/24/24 1214   Temp 36.3 °C (97.3 °F) 06/24/24 1214   Pulse 62 06/24/24 1214   Resp 16 06/24/24 1214   SpO2 98 % 06/24/24 1214       Anesthesia Post Evaluation    Patient location during evaluation: PACU  Patient participation: complete - patient participated  Level of consciousness: awake and alert  Pain score: 0  Pain management: adequate  Airway patency: patent  Cardiovascular status: acceptable  Respiratory status: acceptable  Hydration status: acceptable  Postoperative Nausea and Vomiting: none        There were no known notable events for this encounter.

## 2024-06-24 NOTE — DISCHARGE INSTRUCTIONS
May have Tylenol after: 4:40PM    May have Ibuprofen/advil/motrin/aleve after you get home, you did not receive any during your time at our facility     Follow-Up Instructions    You will need to be seen in clinic in 10-15 days for a post-operative evaluation.  This appointment will be in the outpatient office, not at the Surgery Center.    You will need to call Leanne in my office and schedule an appointment, unless there is a previous appointment that appears on your discharge instructions.  Her phone number is [phone].  Please do not delay in calling to make this appointment.      Activity Restrictions    1)  No driving for 24 hours after surgery, due to the anesthetic.    2)  No driving or operating heavy machinery while taking narcotic pain medication.    3)  Weight bearing as tolerated.  Light use of the fingers (writing, typing, texting) is good to do.     Discharge Medications    A prescription for a narcotic pain medication (Norco) has been sent to your pharmacy of record.  I do not expect you will need this, but wanted you to have it available as an option if over-the-counter medications are not adequately controlling your pain.  Most people simply take Tylenol, Motrin, Advil, or other anti-inflammatory for the pain.  If you do end up taking the prescription medication, please try to wean yourself off this as quickly as possible.    You can add the prescription medication to the anti-inflammatories if needed, but should not add it to Tylenol, as there is already Tylenol in the prescription.    Wound care instructions:     1)  Leave operative dressing in place for 7 days.  If you shower, cover the hand with a plastic bag and elevate it so the water cannot run down into the bag.    2)  After 7 days, remove the bandage and leave the incision open to air, or cover with a simple Band-Aid.   At that point, you may let water run freely over incision when showering.  Do not scrub.  Do not soak in pool or  tub, or submerge the incision until you are fully 21 days from surgery.    3)  Call if any drainage after 7 days, increased redness/warmth/swelling at incision site, abnormal pain/tenderness of the extremity, abnormal swelling of the extremity that does not respond to elevation, shortness of breath, or chest pain.

## 2024-06-24 NOTE — OP NOTE
ORTHOPEDIC OPERATIVE NOTE    Name:     Jennifer Hdz  :     1940  Facility:    [facility]  Date of Surgery:   2024     PREOP DX:          Right Carpal Tunnel Syndrome    POSTOP DX:       Same    PROCEDURE:     Right Carpal Tunnel Release    SURGEON: JR Vishnu MD    RESIDENT/FELLOW/ASSISTANT:  Rustam Tran MD    ANESTHESIA:    MAC Sedation + Local    ESTIMATED BLOOD LOSS :   2 ml    TOTAL FLUIDS:     300 ml LR    SPECIMEN:   None    TOURNIQUET TIME:  8 Minutes at 250 mmHg    FINDINGS: Tight, thick transverse carpal ligament    COMPLICATIONS:  None    PATIENT RETURNED TO/CONDITION:  PACU in Good      INDICATIONS:      Jennifer Hdz is a 83 y.o.,  right-hand dominant female who presents with numbness and tingling in the median nerve distribution of the right hand that has failed to respond to conservative measures.  She is here for elective right carpal tunnel release.  I reminded her of surgical risks of infection; scarring; damage to nerves, tendons, or vessels; stiffness; wound healing problems; failure to relieve symptoms; recurrent symptoms; and pillar pain.  She wished to proceed.     NARRATIVE:    Following identification of the patient and confirmation of correct site of surgery and signed operative consent, she was brought to the operating room and a hand table affixed to the cart.  A light MAC sedation was administered by Anesthesia along with IV antibiotic dose.  A pneumatic tourniquet was placed high on the right arm, and the limb was prepped from fingertip to cuff with chlorhexidine, and draped free in the usual sterile fashion.  7 mL of a mix of 0.5% Marcaine and 1% lidocaine, plain, was instilled to the planned incision area. The limb was exsanguinated with an Esmarch, and the tourniquet inflated.    A standard 2 cm mini-open carpal tunnel incision was made and taken bluntly down to the palmar fascia, which was divided in line with its fibers.  Further  careful blunt dissection revealed the distal edge of the transverse carpal ligament.  The ligament was carefully, sharply, sequentially divided under direct vision as the contents of the carpal tunnel were carefully protected.  This division was done with scissors.  There was good refill of the longitudinal vessel.  The tourniquet was deflated, and pink color rapidly returned to all digits.  Meticulous hemostasis was achieved with bipolar.  After final irrigation, skin was closed with 4-0 vicryl subcutaneous and 4-0 Monocryl skin stitch, and a soft dressing applied.  Patient was awakened and transferred to Recovery in stable condition.          Electronically signed  KARMA Mares MD  [phone]

## 2024-06-24 NOTE — ANESTHESIA PREPROCEDURE EVALUATION
Patient: Jennifer Hdz    Procedure Information       Date/Time: 06/24/24 1130    Procedure: Right Carpal Tunnel Release (Right: Hand)    Location: [facility] 02 / Virtual [facility]    Surgeons: Wally Mares MD            Relevant Problems   Anesthesia (within normal limits)      Cardiac   (+) Hypertension      Neuro   (+) Carpal tunnel syndrome of left wrist   (+) Carpal tunnel syndrome of right wrist   (+) Carpal tunnel syndrome on right      GI   (+) Esophageal reflux      /Renal   (+) Recurrent UTI   (+) Urinary tract infection      Endocrine   (+) Hypothyroidism      Musculoskeletal   (+) Carpal tunnel syndrome of left wrist   (+) Carpal tunnel syndrome of right wrist   (+) Carpal tunnel syndrome on right   (+) Knee osteoarthritis   (+) Spinal stenosis, lumbar      ID   (+) Recurrent UTI   (+) Recurrent cold sores   (+) Urinary tract infection       Clinical information reviewed:   Tobacco  Allergies  Meds  Problems  Med Hx  Surg Hx  OB Status    Fam Hx  Soc Hx        NPO Detail:  NPO/Void Status  Carbohydrate Drink Given Prior to Surgery? : N  Date of Last Liquid: 06/24/24  Time of Last Liquid: 0600  Date of Last Solid: 06/23/24  Time of Last Solid: 1800  Last Intake Type: Clear fluids  Time of Last Void: 0938         Physical Exam    Airway  Mallampati: I  TM distance: >3 FB  Neck ROM: full     Cardiovascular   Rhythm: regular  Rate: normal     Dental    Pulmonary   Breath sounds clear to auscultation     Abdominal      Other findings: Denies loose/chipped/cracked teeth. Upper partial, very secure           Anesthesia Plan    History of general anesthesia?: yes  History of complications of general anesthesia?: no    ASA 2     MAC   (NPO>MN, MAC anesthesia explained, NO smoking, 1 drink/day, no illict drug use)  The patient is not a current smoker.    intravenous induction   Anesthetic plan and risks discussed with patient.    Plan discussed with CRNA and attending.

## 2024-06-25 ASSESSMENT — PAIN SCALES - GENERAL: PAINLEVEL_OUTOF10: 0 - NO PAIN

## 2024-07-10 ENCOUNTER — OFFICE VISIT (OUTPATIENT)
Dept: ORTHOPEDIC SURGERY | Facility: CLINIC | Age: 84
End: 2024-07-10
Payer: MEDICARE

## 2024-07-10 DIAGNOSIS — G56.01 CARPAL TUNNEL SYNDROME OF RIGHT WRIST: Primary | ICD-10-CM

## 2024-07-10 PROCEDURE — 99211 OFF/OP EST MAY X REQ PHY/QHP: CPT | Performed by: ORTHOPAEDIC SURGERY

## 2024-07-10 PROCEDURE — 1157F ADVNC CARE PLAN IN RCRD: CPT | Performed by: ORTHOPAEDIC SURGERY

## 2024-07-10 PROCEDURE — 1159F MED LIST DOCD IN RCRD: CPT | Performed by: ORTHOPAEDIC SURGERY

## 2024-07-10 PROCEDURE — 1160F RVW MEDS BY RX/DR IN RCRD: CPT | Performed by: ORTHOPAEDIC SURGERY

## 2024-07-10 PROCEDURE — 1036F TOBACCO NON-USER: CPT | Performed by: ORTHOPAEDIC SURGERY

## 2024-07-10 PROCEDURE — 1123F ACP DISCUSS/DSCN MKR DOCD: CPT | Performed by: ORTHOPAEDIC SURGERY

## 2024-07-10 NOTE — LETTER
July 11, 2024     Darlene Azul MD  [address]  [facility]  [facility] [address]    Patient: Jennifer Hdz   YOB: 1940   Date of Visit: 7/10/2024       Dear Dr. Darlene Azul MD:    Thank you for referring Jennifer Hdz to me for evaluation. Below are my notes for this consultation.  If you have questions, please do not hesitate to call me. I look forward to following your patient along with you.       Sincerely,     Wally Mares MD      CC: No Recipients  ______________________________________________________________________________________    CHIEF COMPLAINT         Postop check    ASSESSMENT + PLAN    Postop day 16 from right carpal tunnel release    The incision is healing normally.  Sutures are absorbable.  You may get this wet, but should not soak it for one more week.  Advance activity as pain allows.  Work on the stretching exercises that I demonstrated. Contact my office if you would like a formal occupational therapy referral.     Follow up with any concerns.  You may simply contact my office when you are ready to schedule carpal tunnel surgery on the left.        HISTORY OF PRESENT ILLNESS       Patient returns today, as directed, postop day 16 from right carpal tunnel release.  She reports appropriately decreasing pain and no further numbness or tingling on that side.  She is now finding the left side much more bothersome.  Postoperative discomfort is resolving appropriately.  No new concerns.      PHYSICAL EXAM       Patient had remove the bandage as instructed.  Incision clean, dry, intact with absorbable suture in place.  Full composite finger flexion extension with full intrinsic plus minus posture.  Excellent wrist and forearm motion.  Intact thenar motor function with thumb opposition to station 9.  Sensation intact to light touch in all distributions.  Capillary refill less than 2 seconds.      IMAGING / LABS / EMGs        None  today      Electronically Signed      KARMA Mares MD      Orthopaedic Hand Surgery      [phone]

## 2024-07-11 NOTE — PROGRESS NOTES
CHIEF COMPLAINT         Postop check    ASSESSMENT + PLAN    Postop day 16 from right carpal tunnel release    The incision is healing normally.  Sutures are absorbable.  You may get this wet, but should not soak it for one more week.  Advance activity as pain allows.  Work on the stretching exercises that I demonstrated. Contact my office if you would like a formal occupational therapy referral.     Follow up with any concerns.  You may simply contact my office when you are ready to schedule carpal tunnel surgery on the left.        HISTORY OF PRESENT ILLNESS       Patient returns today, as directed, postop day 16 from right carpal tunnel release.  She reports appropriately decreasing pain and no further numbness or tingling on that side.  She is now finding the left side much more bothersome.  Postoperative discomfort is resolving appropriately.  No new concerns.      PHYSICAL EXAM       Patient had remove the bandage as instructed.  Incision clean, dry, intact with absorbable suture in place.  Full composite finger flexion extension with full intrinsic plus minus posture.  Excellent wrist and forearm motion.  Intact thenar motor function with thumb opposition to station 9.  Sensation intact to light touch in all distributions.  Capillary refill less than 2 seconds.      IMAGING / LABS / EMGs        None today      Electronically Signed      KARMA Mares MD      Orthopaedic Hand Surgery      [phone]

## 2024-07-18 DIAGNOSIS — G56.02 CARPAL TUNNEL SYNDROME ON LEFT: ICD-10-CM

## 2024-07-21 PROBLEM — G56.02 CARPAL TUNNEL SYNDROME ON LEFT: Status: ACTIVE | Noted: 2024-07-18

## 2024-07-24 ENCOUNTER — OFFICE VISIT (OUTPATIENT)
Dept: URGENT CARE | Facility: CLINIC | Age: 84
End: 2024-07-24
Payer: MEDICARE

## 2024-07-24 VITALS
HEART RATE: 85 BPM | OXYGEN SATURATION: 96 % | RESPIRATION RATE: 20 BRPM | TEMPERATURE: 97.2 F | DIASTOLIC BLOOD PRESSURE: 86 MMHG | SYSTOLIC BLOOD PRESSURE: 152 MMHG

## 2024-07-24 DIAGNOSIS — S51.811A SKIN TEAR OF RIGHT FOREARM WITHOUT COMPLICATION, INITIAL ENCOUNTER: Primary | ICD-10-CM

## 2024-07-24 PROCEDURE — 3077F SYST BP >= 140 MM HG: CPT | Performed by: PHYSICIAN ASSISTANT

## 2024-07-24 PROCEDURE — 3079F DIAST BP 80-89 MM HG: CPT | Performed by: PHYSICIAN ASSISTANT

## 2024-07-24 PROCEDURE — 99203 OFFICE O/P NEW LOW 30 MIN: CPT | Performed by: PHYSICIAN ASSISTANT

## 2024-07-24 PROCEDURE — 1123F ACP DISCUSS/DSCN MKR DOCD: CPT | Performed by: PHYSICIAN ASSISTANT

## 2024-07-24 PROCEDURE — 1157F ADVNC CARE PLAN IN RCRD: CPT | Performed by: PHYSICIAN ASSISTANT

## 2024-07-24 PROCEDURE — 1125F AMNT PAIN NOTED PAIN PRSNT: CPT | Performed by: PHYSICIAN ASSISTANT

## 2024-07-24 PROCEDURE — 1159F MED LIST DOCD IN RCRD: CPT | Performed by: PHYSICIAN ASSISTANT

## 2024-07-24 ASSESSMENT — ENCOUNTER SYMPTOMS
GASTROINTESTINAL NEGATIVE: 1
PSYCHIATRIC NEGATIVE: 1
NEUROLOGICAL NEGATIVE: 1
EYES NEGATIVE: 1
CARDIOVASCULAR NEGATIVE: 1
ENDOCRINE NEGATIVE: 1
RESPIRATORY NEGATIVE: 1
ALLERGIC/IMMUNOLOGIC NEGATIVE: 1
MUSCULOSKELETAL NEGATIVE: 1
WOUND: 1
CONSTITUTIONAL NEGATIVE: 1
HEMATOLOGIC/LYMPHATIC NEGATIVE: 1

## 2024-07-24 ASSESSMENT — PAIN SCALES - GENERAL
PAINLEVEL: 3
PAINLEVEL: 3

## 2024-07-24 NOTE — PATIENT INSTRUCTIONS
The gelfoam will fall off on its own over the next week  Keep wound clean and dry  Pcp follow up this week if not improving or worsening  ER visit anytime 24/7 for acute worsening or changing condition

## 2024-07-24 NOTE — PROGRESS NOTES
Subjective   Patient ID: Jennifer Hdz is a 83 y.o. female.      History provided by:  Patient   used: No    Wound Check     This is a 83 yr old female here for right forearm wound. Pt sustained a skin tear of the right forearm a week ago when she fell against a wall in the kitchen. Persistant oozing and bleeding wound. No pus drainage or lymphangitis.     Review of Systems   Constitutional: Negative.    HENT: Negative.     Eyes: Negative.    Respiratory: Negative.     Cardiovascular: Negative.    Gastrointestinal: Negative.    Endocrine: Negative.    Genitourinary: Negative.    Musculoskeletal: Negative.    Skin:  Positive for wound.   Allergic/Immunologic: Negative.    Neurological: Negative.    Hematological: Negative.    Psychiatric/Behavioral: Negative.     All other systems reviewed and are negative.  /86 (BP Location: Right arm, Patient Position: Sitting, BP Cuff Size: Adult long)   Pulse 85   Temp 36.2 °C (97.2 °F)   Resp 20   LMP  (LMP Unknown)   SpO2 96%     Objective   Physical Exam  Vitals and nursing note reviewed.   Constitutional:       Appearance: Normal appearance.   HENT:      Head: Normocephalic and atraumatic.   Cardiovascular:      Rate and Rhythm: Normal rate and regular rhythm.   Pulmonary:      Effort: Pulmonary effort is normal.      Breath sounds: Normal breath sounds.   Skin:     General: Skin is warm and dry.      Comments: Shallow open skin tear wound of right forearm. No active bleeding, pus drainage, cellulitis or lymphangitis.   Neurological:      General: No focal deficit present.      Mental Status: She is alert and oriented to person, place, and time.   Psychiatric:         Mood and Affect: Mood normal.         Behavior: Behavior normal.     Procedure-right forearm wound. Placed small piece of gelfoam over the wound. Bandaid applied.     Assessment:  Skin tear of right forearm    Plan:  Dermabond will fall off on its own over the next  week  Keep wound clean and dry  Pcp follow up this week if not improving or worsening  ER visit anytime 24/7 for acute worsening or changing condition

## 2024-07-26 ENCOUNTER — TELEPHONE (OUTPATIENT)
Dept: PRIMARY CARE | Facility: CLINIC | Age: 84
End: 2024-07-26
Payer: MEDICARE

## 2024-07-26 NOTE — TELEPHONE ENCOUNTER
Meliza Patient advised on 1 week ago she fell in her home bumped her left arm cut her arm.   Was using Auqaphor and bandage.      Went to Urgent Care 7/[address] was given a medication that is used for clotting was told to leave dressing on for 1 week and was advised not to get the area wet. Patient stating that the dressing has adhered to wound.     Is following up with Cherry on Monday 7/27

## 2024-07-29 ENCOUNTER — APPOINTMENT (OUTPATIENT)
Dept: PRIMARY CARE | Facility: CLINIC | Age: 84
End: 2024-07-29
Payer: MEDICARE

## 2024-07-29 VITALS
RESPIRATION RATE: 14 BRPM | SYSTOLIC BLOOD PRESSURE: 120 MMHG | BODY MASS INDEX: 29.25 KG/M2 | HEART RATE: 79 BPM | HEIGHT: 60 IN | WEIGHT: 149 LBS | TEMPERATURE: 97.3 F | DIASTOLIC BLOOD PRESSURE: 72 MMHG | OXYGEN SATURATION: 99 %

## 2024-07-29 DIAGNOSIS — S51.811A SKIN TEAR OF FOREARM WITHOUT COMPLICATION, RIGHT, INITIAL ENCOUNTER: Primary | ICD-10-CM

## 2024-07-29 PROCEDURE — 1036F TOBACCO NON-USER: CPT | Performed by: NURSE PRACTITIONER

## 2024-07-29 PROCEDURE — 1157F ADVNC CARE PLAN IN RCRD: CPT | Performed by: NURSE PRACTITIONER

## 2024-07-29 PROCEDURE — 99213 OFFICE O/P EST LOW 20 MIN: CPT | Performed by: NURSE PRACTITIONER

## 2024-07-29 PROCEDURE — 1123F ACP DISCUSS/DSCN MKR DOCD: CPT | Performed by: NURSE PRACTITIONER

## 2024-07-29 PROCEDURE — 3074F SYST BP LT 130 MM HG: CPT | Performed by: NURSE PRACTITIONER

## 2024-07-29 PROCEDURE — 3078F DIAST BP <80 MM HG: CPT | Performed by: NURSE PRACTITIONER

## 2024-07-29 PROCEDURE — 1159F MED LIST DOCD IN RCRD: CPT | Performed by: NURSE PRACTITIONER

## 2024-07-29 NOTE — PROGRESS NOTES
Subjective   Patient ID: Jennifer Hdz is a 83 y.o. female who presents for Wound Check (Wound check for right arm wound x2 weeks. Seen UC x1 week ago. ).    2 weeks on Wednesday she had her tripped over the  door and hit the wall on the edge and ripped a piece of skin off. She cut the skin off.  She put gauze and ointment.  Area was bleeding  after a week. She went to urgent care on [address] which has since closed.   She had a sergifoam put on last Wednesday due to bleeding. It has been difficult to remove.  No surrounding redness, warmth.    Wound Check       Review of Systems  otherwise negative aside from what was mentioned above in HPI.    Objective   /72   Pulse 79   Temp 36.3 °C (97.3 °F) (Temporal)   Resp 14   Ht 1.524 m (5')   Wt 67.6 kg (149 lb)   LMP  (LMP Unknown)   SpO2 99%   BMI 29.10 kg/m²     Physical Exam  Constitutional:       Appearance: Normal appearance.   Cardiovascular:      Rate and Rhythm: Normal rate and regular rhythm.   Pulmonary:      Effort: Pulmonary effort is normal.   Abdominal:      General: Abdomen is flat.   Skin:     Comments: Skin tear to right forearm. Sergifoam in place and stuck to wound bed and surrounding skin.   Neurological:      General: No focal deficit present.      Mental Status: She is alert and oriented to person, place, and time. Mental status is at baseline.   Psychiatric:         Mood and Affect: Mood normal.         Behavior: Behavior normal.         Thought Content: Thought content normal.         Judgment: Judgment normal.       Assessment/Plan   Problem List Items Addressed This Visit             ICD-10-CM    Skin tear of forearm without complication, right, initial encounter - Primary S51.811A     Removed surgifoam.  Cleansed site.  Applied bacitracin and Band-Aid.  Advised wash daily at home with warm soapy water and pat dry. When at home can leave open to air to promote wound healing. Cover when out.

## 2024-07-29 NOTE — TELEPHONE ENCOUNTER
Talked with patient on 7/28/24 and the bandage still on from UC visit and can not remove it   We discussed the application of peroxide before her appointment with Cherry today so can be removed gently   No signs of infection from the skin tear and no pain   She fell against the wall corner and never hit the floor

## 2024-07-29 NOTE — ASSESSMENT & PLAN NOTE
Removed surgifoam.  Cleansed site.  Applied bacitracin and Band-Aid.  Advised wash daily at home with warm soapy water and pat dry. When at home can leave open to air to promote wound healing. Cover when out.

## 2024-07-30 RX ORDER — NITROFURANTOIN MACROCRYSTALS 50 MG/1
CAPSULE ORAL NIGHTLY
COMMUNITY

## 2024-08-13 NOTE — PROGRESS NOTES
"Subjective   Patient ID: Jennifer Hdz is a 83 y.o. female who presents for follow up multiple medical conditions       She just bought herself a new to her vehicle  She has an Escape titan that is deep blue     -s/p right carpal tunnel release 6/24/24 with Dr Mares   She is scheduled for left carpal tunnel release on 8/26/2024 with Dr Mares at [place]  She is happy with her surgical outcome so far with the right repair     Her therapist retired and she saw another therapist but he was not for her   She needs someone to see when she needs a \"tune up\"       HEALTH  PAP 7/05 and no longer needs to repeat   Mammo 4/14, 5/15, 8/16, 12/18, 12/19, 9/20, 6/22, 7/23, ordered 2/2024   BD 1/14 T-2.7, 1/17 T-2.2, 1/20 T-1.8, 6/22 T-1.3, ordered 2/2024   Colon 2006 polyps, 5/11 polyp, 12/17 polyp so Q 1 poor prep, ordered 5/2022   EKG 12/13, 6/15, 8/17, 12/18, 10/20, 3/21, 4/23 ED  Urine 12/14, 12/15, 12/17, 6/18, 12/18, 12/19   Flu  2017, 9/18, 12/19, 10/20, 9/21, 11/22, 11/23  TDAP ? and will update with injury   Pneumovax 2012 last one   Prevnar 12/15, 1/16  Zostavax 2007  Shingrix 5/1/19 and 2/7/19   Moderna CVD 2/18/2021 and 3/11/2021 booster 12/4/2021  Ophth- She is seeing someone at Dr Andres's office.       Review of Systems  All systems negative except those listed in the HPI       Objective   Visit Vitals  /70 (BP Location: Left arm, Patient Position: Sitting, BP Cuff Size: Adult)   Pulse 80   Temp 36 °C (96.8 °F) (Skin)   Resp 17    Body mass index is 28.9 kg/m².      Physical Exam  Vitals reviewed.   Constitutional:       Appearance: Normal appearance.   HENT:      Head: Normocephalic.      Right Ear: Tympanic membrane, ear canal and external ear normal.      Left Ear: Tympanic membrane, ear canal and external ear normal.      Nose: Nose normal.      Mouth/Throat:      Pharynx: Oropharynx is clear.   Eyes:      Conjunctiva/sclera: Conjunctivae normal.   Cardiovascular:      Rate and Rhythm: " Normal rate and regular rhythm.      Pulses: Normal pulses.      Heart sounds: Normal heart sounds.   Pulmonary:      Effort: Pulmonary effort is normal.      Breath sounds: Normal breath sounds.   Abdominal:      General: Bowel sounds are normal.      Palpations: Abdomen is soft.   Musculoskeletal:         General: Normal range of motion.      Cervical back: Normal range of motion and neck supple.      Comments: nodularity right palm, 3rd finger will not go down fully, no infection noted    Skin:     General: Skin is warm.   Neurological:      General: No focal deficit present.      Mental Status: She is alert and oriented to person, place, and time.   Psychiatric:         Mood and Affect: Mood normal.         Behavior: Behavior normal.         Thought Content: Thought content normal.         Judgment: Judgment normal.       Assessment/Plan        Follow up completed    She is  with 3 children. She has a previous history of tobacco use   Her  had dementia (not NPH) and passed in 2022.   She was  36 years. She has good support with her family and friends   Her son does her finances, he goes to her house weekly     Seen in  7/24/2024 for right forearm wound  Wound closed with Dermabond  She saw JULIANNA Martines in 7/24 and wound cleaned and closed with band aid and local care discussed      Bilateral carpal tunnel: On exam: nodularity right palm, 3rd finger will not go down fully 8/24  She has paresthesia in her hands and numbness intermittently.   She has difficulty performing her daily activities due to her Sx. She purchased 2 wrist braces but she takes them off in the middle of the night while asleep.   She is right hand dominant   EMG  2/2024 studies showed chronic CAT- 1 radiculopathy and carpal tunnel bilat  -s/p right carpal tunnel release 6/24/24 with Dr Mares   She is scheduled for left carpal tunnel release on 8/26/2024 with Dr Mares at [place]     She has experienced tinnitus for  years.  This is not problematic for her  She went to [facility] for hearing aids bilaterally     Her weight in office is 148 with BMI of 28.90. We spent 15 minutes discussing diet and weight loss. The struggle of weight loss persists  Explained goal for BMI to be 25 or less    Recommend she look into a plant based/ whole foods diet      HTN: BP Stable   Continue losartan/HCTZ 100/12.5 mg daily   EKG 4/2023 SR rate of 87, left axis, CT interval 150, QTc 450. Frequent PACs.   Stress test 2017 was normal   Continue to monitor BP at home and call with elevated readings      I have spent 15 min face to face with this patient discussing their cardiac risk   We discussed the patients cardiovascular risk. If needed, lifestyle modifications recommended including: behavioral therapies of nutrition choices, exercise and eliminate habits that are contributing to their cardiac risk. We agreed to a plan to decrease his cardiovascular risks. Discussed ASA. Reviewed Guidelines and approved recommendations made to patient.   The patient's 10 yr CV risk was estimated at  : ACO score 4/6 IO 8/2024      Elevated lipids:   Explained goal for LDL to be less than 100 and ideally less than 70   Recommend she look into a plant based/ whole foods diet       Hypothyroid:   Continue levothyroxine 112 mcg a day.    She saw Endo but there were no recommendations that were new      Left sided TMJ:   She follows with her dentist routinely      Concerns for memory issues:   There are times she has difficulty remembering words   She has made plans with friends but forgets about the plans   She has not been driving and forgetting where she is going  She has no issues with leaving things running around the house   She keeps activities logged on a calendar and keeps a notebook close by   She reads a lot and does puzzle books.  She can try OTC memory supplements if she would like.      Situational Depression/ insomnia: Her therapist retired and she saw  "another therapist but he was not for her. She needs someone to see when she needs a \"tune up\". I provided the patient with a list of therapist in the surrounding area (see 'This Visit' page)   She is no longer seeing a therapist   Her  passed in 2022, they were  35 years.   Her stress got to a level that she disassociated when her  passed   Her son is doing her finances for her   Her KIRAN passed away from ALS in 8/18   She exercises to help relieve her stress.   She admits weight gain exacerbates her depressive Sx.   She likes to be in control of her environment   She could not tolerate Lexapro   Discontinued Remeron and Klonopin since it did not help.     Recurrent leg cramps:  She started eating potassium enriched foods and this has helped  She has good relief with yellow mustard prn     Bilateral varicose veins and spider veins right> left   Recommend wearing support stockings during the day   Offered patient appt with a vascular surgeon- she declines for now 11/2023      Recurrent UTI- No recent UTIs to report   UTI 9/20/2021, 10/4/2021 and 10/12/2021, 7/2023.   Continue Macrobid 50 mg daily and restart D- Mannose w/ cranberry daily.    Continue Pyridium to use prn and Estrace cream QOD   Continue Myrbetriq 50 mg daily for OAB   CT urogram and cystoscopy in the past with Dr Mckenzie  UA 7/2023 normal. Urine cultures 7/2023 negative   She saw BERT Sandoval in 7/2023       Back pain and cervicalgia:    She had nerve blocks on 1/21/2022 with Dr Espinoza.   She has tried many different modalities to control pain, they have all failed   MRI L/S 12/2021 severe spinal stenosis lower spine, worse on left side.   MRI of C/S 2/2022 showed multilevel degenerative changes of the cervical spine. Central canal stenosis is most pronounced at C5- 6. There is multilevel neuroforaminal narrowing due to facet and uncovertebral joint hypertrophy  EMG  2/2024 showed chronic CAT- 1 radiculopathy and carpal tunnel bilat "   - S/p back surgery on 3 vertebrae (L3,4, and L5) with Dr Rodriguez 4/21/2022.   Continue gabapentin 300 mg 1 tablet QHS and tizanidine prn.    She is no longer taking Tramadol   Orders placed for ortho evaluation 5/2024      Left hip /arthritis issues:   She saw ortho 7/2022 and had a nerve block but it did not help   S/p LTH anterior approach 10/5/2022 with Dr Perla   She had a bursa injection and it did not help 2022  She saw a different ortho physician and had injection to the bursa 12/2022   She saw Dr Kaye in 11/2023 and had left hip bursa injection      Right shoulder complete reversal on 10/5/2020:   She saw Dr Meza for a second opinion and told she fractured her acromion   She was told there is nothing more that can be done for her right shoulder   She completed PT and doing the exercises at home      She has a raised hard lesion on the left elbow: Concerned this is SCC.  Recommend she see dermatology 3/2024. She will call and make an appt 3/2024      Recurrent Cold sores:  She has cold sores because of stress. She states she has a history of cold sores.  She is using L-lysine and Herpecin for cold sores  Continue Valtrex 1 GM to take as directed with onset of cold sore      Vitamin D def: Levels 36 on labs in 6/2022  Continue OTC Vitamin D 2000 UT daily      Dermatology: she has multiple senile keratosis, nothing suspicious on exam 5/2024  She had BCC removed from her forehead on 2/11/2022  She had BCC removed on 2/18/2022  Continue following with dermatology      Pap no longer needs to repeat    Mammo at [facility] in 7/2023 was negative and ordered 2/2024   Breast exam normal 2/2024      BD 6/2022 T-1.3. Continue OTC Ca 600 mg BID, OTC Vitamin D 2000 UT daily and eat 2 servings of calcium enriched foods daily. Discussed importance of weight bearing exercises. FRAX score was only 14.   Reclast last injection in 12/17.     Colonoscopy 12/17 +polyp and orders placed 5/2022 but not done. She will call GI  and make an appt to see if she needs to do the colonoscopy again due to age 2/2024     Ophth:  She is seeing someone at Dr Andres's office, she was seen in 2019.   She goes Q 2 years for Rx check.   She will have her next eye exam results faxed to my office in order to update her medical records.      Her daughter Lizeth is her MPOA.   Copy of her Advanced Directives scanned in her chart 5/2023        Flu  2017, 9/18, 12/19, 10/20, 9/21, 11/22, 11/23  TDAP ? and will update with injury   Pneumovax 2012 last one   Prevnar 12/15, 1/16  Zostavax 2007  Shingrix 5/1/19 and 2/7/19   Moderna CVD 2/18/2021 and 3/11/2021 booster 12/4/2021     Some elements in the chart were copied from Dr. Azul's last office visit with patient. Notes have been updated where appropriate, and reflect my current medical decision making from today.     RTC in 6 months for MCR or sooner if needed    (MCR due 2/2025)      Scribe Attestation  By signing my name below, I, Amy Greer , Scribe   attest that this documentation has been prepared under the direction and in the presence of Darlene Azul MD.

## 2024-08-14 ENCOUNTER — APPOINTMENT (OUTPATIENT)
Dept: PRIMARY CARE | Facility: CLINIC | Age: 84
End: 2024-08-14
Payer: MEDICARE

## 2024-08-14 VITALS
BODY MASS INDEX: 29.06 KG/M2 | SYSTOLIC BLOOD PRESSURE: 118 MMHG | DIASTOLIC BLOOD PRESSURE: 70 MMHG | TEMPERATURE: 96.8 F | HEART RATE: 80 BPM | OXYGEN SATURATION: 98 % | HEIGHT: 60 IN | WEIGHT: 148 LBS | RESPIRATION RATE: 17 BRPM

## 2024-08-14 DIAGNOSIS — E03.9 HYPOTHYROIDISM, UNSPECIFIED TYPE: ICD-10-CM

## 2024-08-14 DIAGNOSIS — Z98.890 HISTORY OF CARPAL TUNNEL SURGERY OF LEFT WRIST: ICD-10-CM

## 2024-08-14 DIAGNOSIS — S51.811A SKIN TEAR OF FOREARM WITHOUT COMPLICATION, RIGHT, INITIAL ENCOUNTER: ICD-10-CM

## 2024-08-14 DIAGNOSIS — G83.10 PARESIS OF SINGLE LOWER EXTREMITY (MULTI): ICD-10-CM

## 2024-08-14 DIAGNOSIS — S51.811D SKIN TEAR OF RIGHT FOREARM WITHOUT COMPLICATION, SUBSEQUENT ENCOUNTER: ICD-10-CM

## 2024-08-14 DIAGNOSIS — G56.03 BILATERAL CARPAL TUNNEL SYNDROME: ICD-10-CM

## 2024-08-14 DIAGNOSIS — I10 PRIMARY HYPERTENSION: Primary | ICD-10-CM

## 2024-08-14 DIAGNOSIS — Z98.890 HISTORY OF CARPAL TUNNEL SURGERY OF RIGHT WRIST: ICD-10-CM

## 2024-08-14 DIAGNOSIS — F43.9 SITUATIONAL STRESS: ICD-10-CM

## 2024-08-14 DIAGNOSIS — E78.5 DYSLIPIDEMIA: ICD-10-CM

## 2024-08-14 DIAGNOSIS — K21.00 GASTROESOPHAGEAL REFLUX DISEASE WITH ESOPHAGITIS WITHOUT HEMORRHAGE: ICD-10-CM

## 2024-08-14 PROCEDURE — 1157F ADVNC CARE PLAN IN RCRD: CPT | Performed by: INTERNAL MEDICINE

## 2024-08-14 PROCEDURE — 1159F MED LIST DOCD IN RCRD: CPT | Performed by: INTERNAL MEDICINE

## 2024-08-14 PROCEDURE — 3078F DIAST BP <80 MM HG: CPT | Performed by: INTERNAL MEDICINE

## 2024-08-14 PROCEDURE — 1123F ACP DISCUSS/DSCN MKR DOCD: CPT | Performed by: INTERNAL MEDICINE

## 2024-08-14 PROCEDURE — 1160F RVW MEDS BY RX/DR IN RCRD: CPT | Performed by: INTERNAL MEDICINE

## 2024-08-14 PROCEDURE — 3074F SYST BP LT 130 MM HG: CPT | Performed by: INTERNAL MEDICINE

## 2024-08-14 PROCEDURE — G2211 COMPLEX E/M VISIT ADD ON: HCPCS | Performed by: INTERNAL MEDICINE

## 2024-08-14 PROCEDURE — 1036F TOBACCO NON-USER: CPT | Performed by: INTERNAL MEDICINE

## 2024-08-14 PROCEDURE — 99214 OFFICE O/P EST MOD 30 MIN: CPT | Performed by: INTERNAL MEDICINE

## 2024-08-14 ASSESSMENT — PATIENT HEALTH QUESTIONNAIRE - PHQ9
2. FEELING DOWN, DEPRESSED OR HOPELESS: NOT AT ALL
SUM OF ALL RESPONSES TO PHQ9 QUESTIONS 1 AND 2: 0
1. LITTLE INTEREST OR PLEASURE IN DOING THINGS: NOT AT ALL

## 2024-08-14 NOTE — PATIENT INSTRUCTIONS
Here are local therapists-    Practice Address [address] [facility] ZIP Region Phone   [facility] [address] [place] [address] [place] [phone]   [facility] [address] [place] [address] [place] [phone]   [facility] [address]  [place] [address] [place] [phone]   [facility] [address] [place] [address] [place] [phone]   [facility] [address] [place] [address] [place] [phone]   [facility] [address] [facility]  [place] [address] [place] [phone]   [facility] [address] [place] [address] [place] [phone]   [facility] [address] [facility] [address] [place] [phone]   [facility] [address] [facility] [address] [place] [phone]   [facility] [address] [facility]  [place] [address] [place] [phone]   [facility] [address] [place] [address] [place] [phone]   [facility] [address] [place] [address] [place] [phone]   [facility] [address] [place] [address] [place] [phone]   [facility] [address] [facility] [address] [place] [phone]   [facility] [address] [place] [address] [place] [phone]    Psychiatry Consultation - Tayler Moreno [address]  [place] [address] [place] [phone]

## 2024-08-26 ENCOUNTER — ANESTHESIA EVENT (OUTPATIENT)
Dept: OPERATING ROOM | Facility: CLINIC | Age: 84
End: 2024-08-26
Payer: MEDICARE

## 2024-08-26 ENCOUNTER — ANESTHESIA (OUTPATIENT)
Dept: OPERATING ROOM | Facility: CLINIC | Age: 84
End: 2024-08-26
Payer: MEDICARE

## 2024-08-26 ENCOUNTER — HOSPITAL ENCOUNTER (OUTPATIENT)
Facility: CLINIC | Age: 84
Setting detail: OUTPATIENT SURGERY
Discharge: HOME | End: 2024-08-26
Attending: ORTHOPAEDIC SURGERY | Admitting: ORTHOPAEDIC SURGERY
Payer: MEDICARE

## 2024-08-26 VITALS
HEART RATE: 63 BPM | DIASTOLIC BLOOD PRESSURE: 85 MMHG | OXYGEN SATURATION: 96 % | BODY MASS INDEX: 28.47 KG/M2 | SYSTOLIC BLOOD PRESSURE: 174 MMHG | HEIGHT: 61 IN | TEMPERATURE: 96.8 F | RESPIRATION RATE: 16 BRPM | WEIGHT: 150.79 LBS

## 2024-08-26 DIAGNOSIS — G56.02 CARPAL TUNNEL SYNDROME ON LEFT: Primary | ICD-10-CM

## 2024-08-26 DIAGNOSIS — G56.02 CARPAL TUNNEL SYNDROME OF LEFT WRIST: ICD-10-CM

## 2024-08-26 PROCEDURE — 2500000004 HC RX 250 GENERAL PHARMACY W/ HCPCS (ALT 636 FOR OP/ED): Performed by: ANESTHESIOLOGIST ASSISTANT

## 2024-08-26 PROCEDURE — 7100000010 HC PHASE TWO TIME - EACH INCREMENTAL 1 MINUTE: Performed by: ORTHOPAEDIC SURGERY

## 2024-08-26 PROCEDURE — 3700000001 HC GENERAL ANESTHESIA TIME - INITIAL BASE CHARGE: Performed by: ORTHOPAEDIC SURGERY

## 2024-08-26 PROCEDURE — A64721 PR REVISE MEDIAN N/CARPAL TUNNEL SURG: Performed by: ANESTHESIOLOGIST ASSISTANT

## 2024-08-26 PROCEDURE — 64721 CARPAL TUNNEL SURGERY: CPT | Performed by: ORTHOPAEDIC SURGERY

## 2024-08-26 PROCEDURE — 3600000003 HC OR TIME - INITIAL BASE CHARGE - PROCEDURE LEVEL THREE: Performed by: ORTHOPAEDIC SURGERY

## 2024-08-26 PROCEDURE — 99100 ANES PT EXTEME AGE<1 YR&>70: CPT | Performed by: ANESTHESIOLOGY

## 2024-08-26 PROCEDURE — 2500000004 HC RX 250 GENERAL PHARMACY W/ HCPCS (ALT 636 FOR OP/ED): Mod: JG | Performed by: ORTHOPAEDIC SURGERY

## 2024-08-26 PROCEDURE — 7100000009 HC PHASE TWO TIME - INITIAL BASE CHARGE: Performed by: ORTHOPAEDIC SURGERY

## 2024-08-26 PROCEDURE — 3600000008 HC OR TIME - EACH INCREMENTAL 1 MINUTE - PROCEDURE LEVEL THREE: Performed by: ORTHOPAEDIC SURGERY

## 2024-08-26 PROCEDURE — 2500000005 HC RX 250 GENERAL PHARMACY W/O HCPCS: Performed by: ANESTHESIOLOGIST ASSISTANT

## 2024-08-26 PROCEDURE — A64721 PR REVISE MEDIAN N/CARPAL TUNNEL SURG: Performed by: ANESTHESIOLOGY

## 2024-08-26 PROCEDURE — 3700000002 HC GENERAL ANESTHESIA TIME - EACH INCREMENTAL 1 MINUTE: Performed by: ORTHOPAEDIC SURGERY

## 2024-08-26 PROCEDURE — 2500000005 HC RX 250 GENERAL PHARMACY W/O HCPCS: Performed by: ORTHOPAEDIC SURGERY

## 2024-08-26 RX ORDER — BUPIVACAINE HYDROCHLORIDE 5 MG/ML
INJECTION, SOLUTION EPIDURAL; INTRACAUDAL AS NEEDED
Status: DISCONTINUED | OUTPATIENT
Start: 2024-08-26 | End: 2024-08-26 | Stop reason: HOSPADM

## 2024-08-26 RX ORDER — LIDOCAINE HYDROCHLORIDE 10 MG/ML
INJECTION INFILTRATION; PERINEURAL AS NEEDED
Status: DISCONTINUED | OUTPATIENT
Start: 2024-08-26 | End: 2024-08-26 | Stop reason: HOSPADM

## 2024-08-26 RX ORDER — SODIUM CHLORIDE, SODIUM LACTATE, POTASSIUM CHLORIDE, CALCIUM CHLORIDE 600; 310; 30; 20 MG/100ML; MG/100ML; MG/100ML; MG/100ML
100 INJECTION, SOLUTION INTRAVENOUS CONTINUOUS
Status: DISCONTINUED | OUTPATIENT
Start: 2024-08-26 | End: 2024-08-26 | Stop reason: HOSPADM

## 2024-08-26 RX ORDER — CEFAZOLIN SODIUM 2 G/100ML
2 INJECTION, SOLUTION INTRAVENOUS ONCE
Status: DISCONTINUED | OUTPATIENT
Start: 2024-08-26 | End: 2024-08-26 | Stop reason: HOSPADM

## 2024-08-26 RX ORDER — LIDOCAINE HYDROCHLORIDE 20 MG/ML
INJECTION, SOLUTION INFILTRATION; PERINEURAL AS NEEDED
Status: DISCONTINUED | OUTPATIENT
Start: 2024-08-26 | End: 2024-08-26

## 2024-08-26 RX ORDER — CEFAZOLIN 1 G/1
INJECTION, POWDER, FOR SOLUTION INTRAVENOUS AS NEEDED
Status: DISCONTINUED | OUTPATIENT
Start: 2024-08-26 | End: 2024-08-26

## 2024-08-26 RX ORDER — ACETAMINOPHEN 325 MG/1
TABLET ORAL AS NEEDED
Status: DISCONTINUED | OUTPATIENT
Start: 2024-08-26 | End: 2024-08-26

## 2024-08-26 RX ORDER — ONDANSETRON HYDROCHLORIDE 2 MG/ML
4 INJECTION, SOLUTION INTRAVENOUS ONCE AS NEEDED
Status: DISCONTINUED | OUTPATIENT
Start: 2024-08-26 | End: 2024-08-26 | Stop reason: HOSPADM

## 2024-08-26 RX ORDER — PROPOFOL 10 MG/ML
INJECTION, EMULSION INTRAVENOUS CONTINUOUS PRN
Status: DISCONTINUED | OUTPATIENT
Start: 2024-08-26 | End: 2024-08-26

## 2024-08-26 RX ORDER — ALBUTEROL SULFATE 0.83 MG/ML
2.5 SOLUTION RESPIRATORY (INHALATION) ONCE AS NEEDED
Status: DISCONTINUED | OUTPATIENT
Start: 2024-08-26 | End: 2024-08-26 | Stop reason: HOSPADM

## 2024-08-26 RX ORDER — LIDOCAINE IN NACL,ISO-OSMOT/PF 30 MG/3 ML
0.1 SYRINGE (ML) INJECTION ONCE
Status: DISCONTINUED | OUTPATIENT
Start: 2024-08-26 | End: 2024-08-26 | Stop reason: HOSPADM

## 2024-08-26 RX ORDER — SODIUM CHLORIDE, SODIUM LACTATE, POTASSIUM CHLORIDE, CALCIUM CHLORIDE 600; 310; 30; 20 MG/100ML; MG/100ML; MG/100ML; MG/100ML
50 INJECTION, SOLUTION INTRAVENOUS CONTINUOUS
Status: DISCONTINUED | OUTPATIENT
Start: 2024-08-26 | End: 2024-08-26 | Stop reason: HOSPADM

## 2024-08-26 RX ORDER — SODIUM CHLORIDE, SODIUM LACTATE, POTASSIUM CHLORIDE, CALCIUM CHLORIDE 600; 310; 30; 20 MG/100ML; MG/100ML; MG/100ML; MG/100ML
INJECTION, SOLUTION INTRAVENOUS CONTINUOUS PRN
Status: DISCONTINUED | OUTPATIENT
Start: 2024-08-26 | End: 2024-08-26

## 2024-08-26 SDOH — HEALTH STABILITY: MENTAL HEALTH: CURRENT SMOKER: 0

## 2024-08-26 ASSESSMENT — COLUMBIA-SUICIDE SEVERITY RATING SCALE - C-SSRS
1. IN THE PAST MONTH, HAVE YOU WISHED YOU WERE DEAD OR WISHED YOU COULD GO TO SLEEP AND NOT WAKE UP?: NO
2. HAVE YOU ACTUALLY HAD ANY THOUGHTS OF KILLING YOURSELF?: NO
6. HAVE YOU EVER DONE ANYTHING, STARTED TO DO ANYTHING, OR PREPARED TO DO ANYTHING TO END YOUR LIFE?: NO

## 2024-08-26 ASSESSMENT — PAIN SCALES - GENERAL
PAINLEVEL_OUTOF10: 0 - NO PAIN

## 2024-08-26 ASSESSMENT — PAIN - FUNCTIONAL ASSESSMENT
PAIN_FUNCTIONAL_ASSESSMENT: 0-10

## 2024-08-26 NOTE — OP NOTE
ORTHOPEDIC OPERATIVE NOTE    Name:     Jennifer Hdz  :     1940  Facility:    [facility]  Date of Surgery:   2024     PREOP DX:          Left Carpal Tunnel Syndrome    POSTOP DX:       Same    PROCEDURE:     Left Carpal Tunnel Release    SURGEON: JR Vishnu MD    RESIDENT/FELLOW/ASSISTANT:  None    ANESTHESIA:    MAC Sedation + Local    ESTIMATED BLOOD LOSS :   2 ml    TOTAL FLUIDS:     200 ml LR    SPECIMEN:   None    TOURNIQUET TIME:  3 Minutes at 250 mmHg    FINDINGS: Tight, thick transverse carpal ligament    COMPLICATIONS:  None    PATIENT RETURNED TO/CONDITION:  PACU in Good      INDICATIONS:      Jennifer Hdz is a 83 y.o.,  right-hand dominant female who presents with numbness and tingling in the median nerve distribution of the left hand that has failed to respond to conservative measures.  She is here for elective left carpal tunnel release, having had good relief of similar symptoms after surgery by me on the right.  I reminded her of surgical risks of infection; scarring; damage to nerves, tendons, or vessels; stiffness; wound healing problems; failure to relieve symptoms; recurrent symptoms; and pillar pain.  She wished to proceed.     NARRATIVE:    Following identification of the patient and confirmation of correct site of surgery and signed operative consent, she was brought to the operating room and a hand table affixed to the cart.  A light MAC sedation was administered by Anesthesia along with IV antibiotic dose.  A pneumatic tourniquet was placed high on the left arm, and the limb was prepped from fingertip to cuff with chlorhexidine, and draped free in the usual sterile fashion.  7 mL of a mix of 0.5% Marcaine and 1% lidocaine, plain, was instilled to the planned incision area. The limb was exsanguinated with an Esmarch, and the tourniquet inflated.    A standard 2 cm mini-open carpal tunnel incision was made and taken bluntly down to the palmar  fascia, which was divided in line with its fibers.  Further careful blunt dissection revealed the distal edge of the transverse carpal ligament.  The ligament was carefully, sharply, sequentially divided under direct vision as the contents of the carpal tunnel were carefully protected.  This division was done with scissors.  There was good refill of the longitudinal vessel.  The tourniquet was deflated, and pink color rapidly returned to all digits.  Meticulous hemostasis was achieved with bipolar.  After final irrigation, skin was closed with 4-0 vicryl subcutaneous and 4-0 Monocryl skin stitch, and a soft dressing applied.  Patient was awakened and transferred to Recovery in stable condition.          Electronically signed  KARMA Mares MD  [phone]

## 2024-08-26 NOTE — DISCHARGE INSTRUCTIONS
Follow-Up Instructions    You will need to be seen in clinic in 10-15 days for a post-operative evaluation.  This appointment will be in the outpatient office, not at the Surgery Center.    You will need to call Leanne in my office and schedule an appointment, unless there is a previous appointment that appears on your discharge instructions.  Her phone number is [phone].  Please do not delay in calling to make this appointment.      Activity Restrictions    1)  No driving for 24 hours after surgery, due to the anesthetic.    2)  No driving or operating heavy machinery while taking narcotic pain medication.    3)  Weight bearing as tolerated.  Light use of the fingers (writing, typing, texting) is good to do.     Discharge Medications    Due to your history of multiple medication intolerances, I have not sent a prescription for a narcotic.  I do not expect you will need one, but expect you can use whatever you took for your previous surgery.  Usually over-the-counter medications will adequately control your pain.  Most people simply take Tylenol, Motrin, Advil, or other anti-inflammatory for the pain.  If you do end up needing the prescription medication, please try to wean yourself off this as quickly as possible.  Contact my office if you need a prescription after all.      Wound care instructions:     1)  Leave operative dressing in place for 7 days.  If you shower, cover the hand with a plastic bag and elevate it so the water cannot run down into the bag.    2)  After 7 days, remove the bandage and leave the incision open to air, or cover with a simple Band-Aid.   At that point, you may let water run freely over incision when showering.  Do not scrub.  Do not soak in pool or tub, or submerge the incision until you are fully 21 days from surgery.    3)  Call if any drainage after 7 days, increased redness/warmth/swelling at incision site, abnormal pain/tenderness of the extremity, abnormal swelling of the  extremity that does not respond to elevation, shortness of breath, or chest pain.    650 mg acetaminophen given @ 0750.  Next dose after 150 pm.    May take ibuprofen as needed for pain or discomfort.     Dr. Mares [phone]     To reach your physician after hours call [phone] and ask for the orthopedic physician on call.

## 2024-08-26 NOTE — H&P
History Of Present Illness  Jennifer Hdz is a 83 y.o. female presenting with numbness and tingling in the median nerve distribution of the left hand that has failed to resolve with conservative measures.  She had good relief of similar symptoms after surgery by me on the right.  She is here for carpal tunnel release on the left.     Past Medical History  She has a past medical history of Bunion of right foot, Cramp and spasm (12/23/2018), Enthesopathy, unspecified, Generalized anxiety disorder (08/30/2021), GERD (gastroesophageal reflux disease), Hypertension, Other amnesia (09/23/2021), Other bursitis of hip, left hip (08/05/2022), Other conditions influencing health status, Pain in unspecified hip (08/05/2022), Pain in unspecified knee (07/31/2015), Personal history of other diseases of the digestive system, Personal history of other specified conditions (06/11/2014), Personal history of urinary (tract) infections (12/04/2013), Personal history of urinary (tract) infections (06/04/2015), 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    Surgical History  She has a past surgical history that includes Shoulder surgery (03/25/2021) and Other surgical history (02/16/2022).     Social History  She reports that she has quit smoking. Her smoking use included cigarettes. She has never used smokeless tobacco. She reports current alcohol use of about 7.0 standard drinks of alcohol per week. She reports that she does not use drugs.    Family History  Family History   Problem Relation Name Age of Onset    Lung cancer Mother      Hypertension Father      Tuberculosis Father      Lung cancer Mother's Sister      Other (Lung Surgery) Mother's Brother           Allergies  Codeine, Escitalopram, Hydromorphone, Hydroxyzine, Morphine, and Oxycodone-acetaminophen    Review of systems    A 30-item multi-system Review Of Systems was obtained on today's intake form.  This was reviewed with the patient and is correct.  The pertinent positives and negatives are listed above.  The form has been scanned separately into the medical record.     Physical exam    Constitutional:    Appears stated age. Well-developed and well-nourished female in no acute distress.  Psychiatric:         Pleasant normal mood and affect. Behavior is appropriate for the situation.   Head:                   Normocephalic and atraumatic.  Eyes:                    Pupils are equal and round.  Cardiovascular:  2+ radial and ulnar pulses. Fingers well-perfused.  Respiratory:        Effort normal. No respiratory distress. Speaking in complete sentences.  Neurologic:       Alert and oriented to person, place, and time.  Skin:                Skin is intact, warm and dry.  Hematologic / Lymphatic:    No lymphedema or lymphangitis.    Extremities / Musculoskeletal:              Skin of the left hand and wrist is intact with no erythema, ecchymosis, or diffuse swelling.  Normal skin drag and coloration.  Full composite finger flexion extension with good intrinsic plus minus posture.  Positive Phalen and Durkan but negative Tinel at wrist and elbow.  Cervical range of motion does not reproduce chief complaint.  Negative Kary.  Sensation intact to light touch in all distributions.  Capillary refill less than 2 seconds.     Last Recorded Vitals  There were no vitals taken for this visit.      Assessment & Plan  Carpal tunnel syndrome on left      Left carpal tunnel syndrome    The nature of carpal tunnel syndrome was reviewed, along with the slowly progressive natural history.  The options for management were reviewed, including night splinting, cortisone injection, or surgical carpal tunnel release.  The major  benefits and risks of surgery were specifically reviewed, as was the postoperative rehabilitation course.    The patient does want to go ahead with surgery today.  The procedure will be done under sedation and local       I spent 12 minutes in the professional and overall care of this patient.      Wally Mares MD

## 2024-08-26 NOTE — ANESTHESIA PREPROCEDURE EVALUATION
Patient: Jennifer Hdz    Procedure Information       Date/Time: 08/26/24 0830    Procedure: Left Carpal Tunnel Release (Left: Hand)    Location: [facility] OR 04 / [facility]    Surgeons: Wally Mares MD          84 y/o woman here for Left Carpal tunnel release.     PMH:   -HTN: BP Stable, ON losartan/HCTZ 100/12.5 mg daily   EKG 4/2023 SR rate of 87, left axis, ID interval 150, QTc 450. Frequent PACs.   Stress test 2017 was normal   - Hypothyroid    Relevant Problems   Cardiac   (+) Hypertension      Neuro   (+) Carpal tunnel syndrome of left wrist   (+) Carpal tunnel syndrome of right wrist   (+) Carpal tunnel syndrome on left   (+) Carpal tunnel syndrome on right      GI   (+) Esophageal reflux      /Renal   (+) Recurrent UTI   (+) Urinary tract infection      Endocrine   (+) Hypothyroidism      Musculoskeletal   (+) Carpal tunnel syndrome of left wrist   (+) Carpal tunnel syndrome of right wrist   (+) Carpal tunnel syndrome on left   (+) Carpal tunnel syndrome on right   (+) Knee osteoarthritis   (+) Spinal stenosis, lumbar      ID   (+) Recurrent UTI   (+) Recurrent cold sores   (+) Urinary tract infection       Clinical information reviewed:   Tobacco  Allergies  Meds   Med Hx  Surg Hx   Fam Hx  Soc Hx        NPO Detail:  NPO/Void Status  Carbohydrate Drink Given Prior to Surgery? : N  Date of Last Liquid: 08/26/24  Time of Last Liquid: 0630  Date of Last Solid: 08/25/24  Time of Last Solid: 1800  Last Intake Type: Clear fluids  Time of Last Void: 0734         There were no vitals filed for this visit.    Past Surgical History:   Procedure Laterality Date    OTHER SURGICAL HISTORY  02/16/2022    Excision of basal cell carcinoma    SHOULDER SURGERY  03/25/2021    Shoulder Surgery Right     Past Medical History:   Diagnosis Date    Bunion of right foot     Bunion, right foot    Cramp and spasm 12/23/2018    Muscle cramps    Enthesopathy, unspecified     Bone spur    Generalized  anxiety disorder 08/30/2021    Anxiety in acute stress reaction    GERD (gastroesophageal reflux disease)     Hypertension     Other amnesia 09/23/2021    Transient amnesia    Other bursitis of hip, left hip 08/05/2022    Hip bursitis, left    Other conditions influencing health status     Partial Tear Of Right Rotator Cuff Tendon    Pain in unspecified hip 08/05/2022    Joint pain, hip    Pain in unspecified knee 07/31/2015    Knee pain    Personal history of other diseases of the digestive system     History of appendicitis    Personal history of other specified conditions 06/11/2014    History of abdominal pain    Personal history of urinary (tract) infections 12/04/2013    History of acute cystitis    Personal history of urinary (tract) infections 06/04/2015    History of urinary tract infection    Pleurodynia 12/02/2016    Rib pain    Radiculopathy, site unspecified 01/19/2022    Radicular pain of right lower extremity    Radiculopathy, site unspecified 02/16/2022    Radicular pain of left lower extremity    Sleep related leg cramps 06/04/2020    Nocturnal leg cramps    Synovial cyst of popliteal space (Baker), unspecified knee 07/31/2015    Baker's cyst    Unspecified injury of thorax, initial encounter 12/02/2016    Rib injury     No current facility-administered medications for this encounter.    Facility-Administered Medications Ordered in Other Encounters:     lactated Ringer's infusion, , intravenous, Continuous PRN, Shanta Snow, LILIAN, New Bag at 08/26/24 0736  Allergies   Allergen Reactions    Codeine Itching    Escitalopram Unknown    Hydromorphone Unknown    Hydroxyzine Unknown    Morphine Other and Nausea/vomiting    Oxycodone-Acetaminophen Itching     Social History     Tobacco Use    Smoking status: Former     Types: Cigarettes    Smokeless tobacco: Never   Substance Use Topics    Alcohol use: Yes     Alcohol/week: 7.0 standard drinks of alcohol     Types: 7 Glasses of wine per week          Chemistry    Lab Results   Component Value Date/Time     (L) 05/01/2023 1300    K 4.0 05/01/2023 1300     05/01/2023 1300    CO2 28 05/01/2023 1300    BUN 14 05/01/2023 1300    CREATININE 0.99 05/01/2023 1300    Lab Results   Component Value Date/Time    CALCIUM 8.9 05/01/2023 1300    ALKPHOS 70 05/01/2023 1300    AST 22 05/01/2023 1300    ALT 29 05/01/2023 1300    BILITOT 0.3 05/01/2023 1300          Lab Results   Component Value Date/Time    WBC 10.9 05/01/2023 1300    HGB 11.6 (L) 05/01/2023 1300    HCT 36.4 05/01/2023 1300     (H) 05/01/2023 1300     Lab Results   Component Value Date/Time    PROTIME 9.5 04/18/2022 1153    INR 0.9 04/18/2022 1153           NPO Detail:  NPO/Void Status  Carbohydrate Drink Given Prior to Surgery? : N  Date of Last Liquid: 08/26/24  Time of Last Liquid: 0630  Date of Last Solid: 08/25/24  Time of Last Solid: 1800  Last Intake Type: Clear fluids  Time of Last Void: 0734         Review of Systems    Physical Exam    Airway  Mallampati: II  TM distance: >3 FB     Cardiovascular   Rhythm: regular     Dental - normal exam     Pulmonary   Breath sounds clear to auscultation     Abdominal            Anesthesia Plan    History of general anesthesia?: yes  History of complications of general anesthesia?: no    ASA 3     MAC   (GA-TIVA)  The patient is not a current smoker.    intravenous induction   Anesthetic plan and risks discussed with patient.    Plan discussed with attending and CAA.

## 2024-08-26 NOTE — ANESTHESIA POSTPROCEDURE EVALUATION
Patient: Jennifer Hdz    Procedure Summary       Date: 08/26/24 Room / Location: [facility] OR 04 / [facility]    Anesthesia Start: 0800 Anesthesia Stop: 0829    Procedure: Left Carpal Tunnel Release (Left: Hand) Diagnosis:       Carpal tunnel syndrome on left      (Carpal tunnel syndrome on left [G56.02])    Surgeons: Wally Mares MD Responsible Provider: Amarilys Lewis MD    Anesthesia Type: MAC ASA Status: 3            Anesthesia Type: MAC    Vitals Value Taken Time   /85 08/26/24 0856   Temp 36 °C (96.8 °F) 08/26/24 0856   Pulse 63 08/26/24 0856   Resp 16 08/26/24 0856   SpO2 96 % 08/26/24 0856       Anesthesia Post Evaluation    Patient location during evaluation: PACU  Patient participation: complete - patient participated  Level of consciousness: awake and alert  Pain management: adequate  Airway patency: patent  Cardiovascular status: acceptable  Respiratory status: acceptable  Hydration status: acceptable  Postoperative Nausea and Vomiting: none        There were no known notable events for this encounter.

## 2024-08-27 DIAGNOSIS — G56.01 CARPAL TUNNEL SYNDROME OF RIGHT WRIST: Primary | ICD-10-CM

## 2024-08-27 RX ORDER — HYDROCODONE BITARTRATE AND ACETAMINOPHEN 5; 325 MG/1; MG/1
1 TABLET ORAL EVERY 6 HOURS PRN
Qty: 20 TABLET | Refills: 0 | Status: SHIPPED | OUTPATIENT
Start: 2024-08-27 | End: 2024-09-03

## 2024-08-27 ASSESSMENT — PAIN SCALES - GENERAL: PAINLEVEL_OUTOF10: 8

## 2024-09-04 ENCOUNTER — OFFICE VISIT (OUTPATIENT)
Dept: ORTHOPEDIC SURGERY | Facility: CLINIC | Age: 84
End: 2024-09-04
Payer: MEDICARE

## 2024-09-04 DIAGNOSIS — G56.02 CARPAL TUNNEL SYNDROME OF LEFT WRIST: Primary | ICD-10-CM

## 2024-09-04 PROCEDURE — 1160F RVW MEDS BY RX/DR IN RCRD: CPT | Performed by: ORTHOPAEDIC SURGERY

## 2024-09-04 PROCEDURE — 1123F ACP DISCUSS/DSCN MKR DOCD: CPT | Performed by: ORTHOPAEDIC SURGERY

## 2024-09-04 PROCEDURE — 99211 OFF/OP EST MAY X REQ PHY/QHP: CPT | Performed by: ORTHOPAEDIC SURGERY

## 2024-09-04 PROCEDURE — 1157F ADVNC CARE PLAN IN RCRD: CPT | Performed by: ORTHOPAEDIC SURGERY

## 2024-09-04 PROCEDURE — 1159F MED LIST DOCD IN RCRD: CPT | Performed by: ORTHOPAEDIC SURGERY

## 2024-09-04 PROCEDURE — 1036F TOBACCO NON-USER: CPT | Performed by: ORTHOPAEDIC SURGERY

## 2024-09-04 RX ORDER — GABAPENTIN 100 MG/1
100 CAPSULE ORAL 3 TIMES DAILY
Qty: 90 CAPSULE | Refills: 1 | Status: SHIPPED | OUTPATIENT
Start: 2024-09-04 | End: 2025-03-03

## 2024-09-04 NOTE — LETTER
September 13, 2024     Darlene Azul MD  [address]  [facility]  [facility] [address]    Patient: Jennifer Hdz   YOB: 1940   Date of Visit: 9/4/2024       Dear Dr. Darlene Azul MD:    Thank you for referring Jennifer Hdz to me for evaluation. Below are my notes for this consultation.  If you have questions, please do not hesitate to call me. I look forward to following your patient along with you.       Sincerely,     Wally Mares MD      CC: No Recipients  ______________________________________________________________________________________    CHIEF COMPLAINT         Postop check    ASSESSMENT + PLAN    Postop day 9 from left carpal tunnel release    I reviewed the unusual pattern of nerve changes since surgery.  We discussed options for management, and agreed on simply staying the course, as it is still quite early in the recovery.  We discussed medical options and agreed on use of gabapentin.  A prescription for 100 mg, 3 times daily, for 30 days was transmitted to your pharmacy of choice.  Take these until they are gone.    The incision is healing normally.  Sutures are absorbable.  You may get this wet, but should not soak it for one more week.  Advance activity as pain allows.  Work on the stretching exercises that I demonstrated. Contact my office if you would like a formal occupational therapy referral.     Follow up with me in 3 or 4 weeks for clinical check, or certainly sooner with any concerns.        HISTORY OF PRESENT ILLNESS       Patient returns early today, just postop day 9 from her left carpal tunnel release.  Her right side is asymptomatic since surgery in June.  On the left, she has had more pain than she recalls on her previous side.  She has not been working on motion particularly and has some stiffness, though that is not unexpected this close to surgery.  She reports some decreased sensation along the radial aspect of  the thumb and the tip of the ulnar side but not along the ulnar shaft.  There is some altered sensation in the index finger but not the long.  It is primarily at the PIP joints and less at the tip.  The palm of the hand is fine.  Incisional discomfort is resolving appropriately.  She describes the tingling sometimes goes up as high as the elbow.      PHYSICAL EXAM       Postop dressing removed.  The incision is clean, dry, intact with no surrounding erythema, fluctuance, or expressible fluid.  No sign concerning for infection.  Composite flexion of index and long lacks about 5 cm of the crease.  Thumb flexion and opposition is intact.  5/5 APB with no wasting.  Full composite extension.  Normal wrist motion.  Sensation is indeed altered as above in the radial thumb but not so much the ulnar.  It does involve the index and splits the long.  Capillary refill less than 2 seconds throughout.  Normal coloration and skin drag.      IMAGING / LABS / EMGs        None today      Electronically Signed      KARMA Mares MD      Orthopaedic Hand Surgery      [phone]

## 2024-09-11 ENCOUNTER — APPOINTMENT (OUTPATIENT)
Dept: ORTHOPEDIC SURGERY | Facility: CLINIC | Age: 84
End: 2024-09-11
Payer: MEDICARE

## 2024-09-14 NOTE — PROGRESS NOTES
CHIEF COMPLAINT         Postop check    ASSESSMENT + PLAN    Postop day 9 from left carpal tunnel release    I reviewed the unusual pattern of nerve changes since surgery.  We discussed options for management, and agreed on simply staying the course, as it is still quite early in the recovery.  We discussed medical options and agreed on use of gabapentin.  A prescription for 100 mg, 3 times daily, for 30 days was transmitted to your pharmacy of choice.  Take these until they are gone.    The incision is healing normally.  Sutures are absorbable.  You may get this wet, but should not soak it for one more week.  Advance activity as pain allows.  Work on the stretching exercises that I demonstrated. Contact my office if you would like a formal occupational therapy referral.     Follow up with me in 3 or 4 weeks for clinical check, or certainly sooner with any concerns.        HISTORY OF PRESENT ILLNESS       Patient returns early today, just postop day 9 from her left carpal tunnel release.  Her right side is asymptomatic since surgery in June.  On the left, she has had more pain than she recalls on her previous side.  She has not been working on motion particularly and has some stiffness, though that is not unexpected this close to surgery.  She reports some decreased sensation along the radial aspect of the thumb and the tip of the ulnar side but not along the ulnar shaft.  There is some altered sensation in the index finger but not the long.  It is primarily at the PIP joints and less at the tip.  The palm of the hand is fine.  Incisional discomfort is resolving appropriately.  She describes the tingling sometimes goes up as high as the elbow.      PHYSICAL EXAM       Postop dressing removed.  The incision is clean, dry, intact with no surrounding erythema, fluctuance, or expressible fluid.  No sign concerning for infection.  Composite flexion of index and long lacks about 5 cm of the crease.  Thumb flexion and  opposition is intact.  5/5 APB with no wasting.  Full composite extension.  Normal wrist motion.  Sensation is indeed altered as above in the radial thumb but not so much the ulnar.  It does involve the index and splits the long.  Capillary refill less than 2 seconds throughout.  Normal coloration and skin drag.      IMAGING / LABS / EMGs        None today      Electronically Signed      KARMA Mares MD      Orthopaedic Hand Surgery      [phone]

## 2024-09-25 ENCOUNTER — APPOINTMENT (OUTPATIENT)
Dept: ORTHOPEDIC SURGERY | Facility: CLINIC | Age: 84
End: 2024-09-25
Payer: MEDICARE

## 2024-09-25 DIAGNOSIS — G56.02 CARPAL TUNNEL SYNDROME OF LEFT WRIST: Primary | ICD-10-CM

## 2024-09-25 PROCEDURE — 1157F ADVNC CARE PLAN IN RCRD: CPT | Performed by: ORTHOPAEDIC SURGERY

## 2024-09-25 PROCEDURE — 1036F TOBACCO NON-USER: CPT | Performed by: ORTHOPAEDIC SURGERY

## 2024-09-25 PROCEDURE — 1160F RVW MEDS BY RX/DR IN RCRD: CPT | Performed by: ORTHOPAEDIC SURGERY

## 2024-09-25 PROCEDURE — 1159F MED LIST DOCD IN RCRD: CPT | Performed by: ORTHOPAEDIC SURGERY

## 2024-09-25 PROCEDURE — 99211 OFF/OP EST MAY X REQ PHY/QHP: CPT | Performed by: ORTHOPAEDIC SURGERY

## 2024-09-25 PROCEDURE — 1123F ACP DISCUSS/DSCN MKR DOCD: CPT | Performed by: ORTHOPAEDIC SURGERY

## 2024-09-25 NOTE — LETTER
October 13, 2024     Darlene Azul MD  [address]  [facility], [address]  [facility] [address]    Patient: Jennifer Hdz   YOB: 1940   Date of Visit: 9/25/2024       Dear Dr. Darlene Azul MD:    Thank you for referring Jennifer Hdz to me for evaluation. Below are my notes for this consultation.  If you have questions, please do not hesitate to call me. I look forward to following your patient along with you.       Sincerely,     Wally Mares MD      CC: No Recipients  ______________________________________________________________________________________    CHIEF COMPLAINT         Carpal tunnel follow-up    ASSESSMENT + PLAN    Persistent symptoms after left carpal tunnel release 4 weeks ago    At this point the wound is healing normally.  Motion is improving but not yet normalized.  We discussed options for management and agreed on taking the gabapentin and giving this more time for the swelling to come down.    Call my office if there is any worsening of the symptoms, but otherwise follow-up in 4 weeks for a clinical check.        HISTORY OF PRESENT ILLNESS       Patient returns today, now 4-1/2 weeks postop from left carpal tunnel release.  She is continuing to have some tingling and an unusual distribution after surgery.  The thumb is now okay out to the IP joint.  Just the tip of the thumb and radial and ulnar aspects of the index finger are bothersome.  The long finger is improving.  Ring finger has normalized.  She is prescribed gabapentin 100 mg 3 times daily.      PHYSICAL EXAM       She remains well-developed, well-nourished female in no acute distress.  She appears her stated age and has a pleasant affect.  Incision is well-healed and nontender.  Intact thenar motor function with 5/5 thumb abduction strength.  Intact IP flexion extension and good finger flexion extension, lacking just 1 cm of the crease.  Sensation intact to light touch by blinded  confrontation as above.  Dorsal sensation is normal.  Small finger sensation is normal.  Capillary refill less than to seconds throughout.  2+ radial and ulnar pulses.      IMAGING / LABS / EMGs        None today      Electronically Signed      KARMA Mares MD      Orthopaedic Hand Surgery      [phone]

## 2024-09-30 DIAGNOSIS — N95.2 ATROPHIC VAGINITIS: ICD-10-CM

## 2024-09-30 DIAGNOSIS — G56.02 CARPAL TUNNEL SYNDROME OF LEFT WRIST: ICD-10-CM

## 2024-09-30 RX ORDER — ESTRADIOL 0.1 MG/G
CREAM VAGINAL
Qty: 42.5 G | Refills: 2 | Status: SHIPPED | OUTPATIENT
Start: 2024-09-30

## 2024-10-07 ENCOUNTER — OFFICE VISIT (OUTPATIENT)
Dept: PAIN MEDICINE | Facility: CLINIC | Age: 84
End: 2024-10-07
Payer: MEDICARE

## 2024-10-07 VITALS
HEIGHT: 59 IN | HEART RATE: 67 BPM | WEIGHT: 148 LBS | BODY MASS INDEX: 29.84 KG/M2 | SYSTOLIC BLOOD PRESSURE: 145 MMHG | OXYGEN SATURATION: 97 % | RESPIRATION RATE: 18 BRPM | DIASTOLIC BLOOD PRESSURE: 70 MMHG | TEMPERATURE: 97.7 F

## 2024-10-07 DIAGNOSIS — G56.02 CARPAL TUNNEL SYNDROME OF LEFT WRIST: ICD-10-CM

## 2024-10-07 PROCEDURE — 1123F ACP DISCUSS/DSCN MKR DOCD: CPT | Performed by: ANESTHESIOLOGY

## 2024-10-07 PROCEDURE — 3077F SYST BP >= 140 MM HG: CPT | Performed by: ANESTHESIOLOGY

## 2024-10-07 PROCEDURE — 1125F AMNT PAIN NOTED PAIN PRSNT: CPT | Performed by: ANESTHESIOLOGY

## 2024-10-07 PROCEDURE — 3078F DIAST BP <80 MM HG: CPT | Performed by: ANESTHESIOLOGY

## 2024-10-07 PROCEDURE — 1036F TOBACCO NON-USER: CPT | Performed by: ANESTHESIOLOGY

## 2024-10-07 PROCEDURE — 1160F RVW MEDS BY RX/DR IN RCRD: CPT | Performed by: ANESTHESIOLOGY

## 2024-10-07 PROCEDURE — 99214 OFFICE O/P EST MOD 30 MIN: CPT | Performed by: ANESTHESIOLOGY

## 2024-10-07 PROCEDURE — 1157F ADVNC CARE PLAN IN RCRD: CPT | Performed by: ANESTHESIOLOGY

## 2024-10-07 PROCEDURE — 99204 OFFICE O/P NEW MOD 45 MIN: CPT | Performed by: ANESTHESIOLOGY

## 2024-10-07 PROCEDURE — 1159F MED LIST DOCD IN RCRD: CPT | Performed by: ANESTHESIOLOGY

## 2024-10-07 RX ORDER — GABAPENTIN 100 MG/1
200 CAPSULE ORAL 2 TIMES DAILY
Qty: 90 CAPSULE | Refills: 0 | Status: SHIPPED | OUTPATIENT
Start: 2024-10-07 | End: 2024-10-30

## 2024-10-07 RX ORDER — GABAPENTIN 300 MG/1
300 CAPSULE ORAL NIGHTLY
Qty: 60 CAPSULE | Refills: 0 | Status: SHIPPED | OUTPATIENT
Start: 2024-10-07 | End: 2024-12-06

## 2024-10-07 SDOH — ECONOMIC STABILITY: FOOD INSECURITY: WITHIN THE PAST 12 MONTHS, YOU WORRIED THAT YOUR FOOD WOULD RUN OUT BEFORE YOU GOT MONEY TO BUY MORE.: NEVER TRUE

## 2024-10-07 SDOH — ECONOMIC STABILITY: FOOD INSECURITY: WITHIN THE PAST 12 MONTHS, THE FOOD YOU BOUGHT JUST DIDN'T LAST AND YOU DIDN'T HAVE MONEY TO GET MORE.: NEVER TRUE

## 2024-10-07 ASSESSMENT — ENCOUNTER SYMPTOMS
NECK PAIN: 0
NUMBNESS: 1
SHORTNESS OF BREATH: 0
LOSS OF SENSATION IN FEET: 0
OCCASIONAL FEELINGS OF UNSTEADINESS: 0
FEVER: 0
DIFFICULTY URINATING: 0
BACK PAIN: 0
EYE PAIN: 0
BLOOD IN STOOL: 0
DEPRESSION: 0
ADENOPATHY: 0

## 2024-10-07 ASSESSMENT — PAIN SCALES - GENERAL
PAINLEVEL_OUTOF10: 3
PAINLEVEL: 3

## 2024-10-07 ASSESSMENT — LIFESTYLE VARIABLES: TOTAL SCORE: 0

## 2024-10-07 ASSESSMENT — COLUMBIA-SUICIDE SEVERITY RATING SCALE - C-SSRS
2. HAVE YOU ACTUALLY HAD ANY THOUGHTS OF KILLING YOURSELF?: NO
6. HAVE YOU EVER DONE ANYTHING, STARTED TO DO ANYTHING, OR PREPARED TO DO ANYTHING TO END YOUR LIFE?: NO
1. IN THE PAST MONTH, HAVE YOU WISHED YOU WERE DEAD OR WISHED YOU COULD GO TO SLEEP AND NOT WAKE UP?: NO

## 2024-10-07 ASSESSMENT — PAIN - FUNCTIONAL ASSESSMENT: PAIN_FUNCTIONAL_ASSESSMENT: 0-10

## 2024-10-07 ASSESSMENT — PAIN DESCRIPTION - DESCRIPTORS: DESCRIPTORS: NUMBNESS

## 2024-10-07 NOTE — PROGRESS NOTES
Chief Complain    New Patient Visit (For pain in left carpal tunnel surgery on left hand 8/26 with dr Hodges. Was in excruciating pain after pain afterwards.Have the worse pain at night. Had back surgery 3 years ago. Have images on file. Was taking hydrocodone afterwards with no relief. Currently taking gabapentin and helping some. Was referred by dr hodges to manage pain.)    History Of Present Illness  Jennifer Hdz is a 83 y.o. female here for evaluation of left hand pain. The patient has been experiencing these symptoms for last 6week(s). The patient describes the pain as numbing. The patient's current pain score is 3-4 on a scale from 0-10. The pain is worsened at night  and is alleviated by  moving around . Since the start of the symptoms the pain has been unchanged.    The patient denies any fever, chills, weight loss, weakness, numbness, bladder/ bowel incontinence, history of cancer, history of IV drug abuse, recent trauma.      Past Medical History  She has a past medical history of Bunion of right foot, Cramp and spasm (12/23/2018), Enthesopathy, unspecified, Generalized anxiety disorder (08/30/2021), GERD (gastroesophageal reflux disease), Hypertension, Other amnesia (09/23/2021), Other bursitis of hip, left hip (08/05/2022), Other conditions influencing health status, Pain in unspecified hip (08/05/2022), Pain in unspecified knee (07/31/2015), Personal history of other diseases of the digestive system, Personal history of other specified conditions (06/11/2014), Personal history of urinary (tract) infections (12/04/2013), Personal history of urinary (tract) infections (06/04/2015), 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    Surgical  History  She has a past surgical history that includes Shoulder surgery (03/25/2021) and Other surgical history (02/16/2022).    Social History  She reports that she has quit smoking. Her smoking use included cigarettes. She has never used smokeless tobacco. She reports current alcohol use of about 7.0 standard drinks of alcohol per week. She reports that she does not use drugs.    Family History  Family History   Problem Relation Name Age of Onset    Lung cancer Mother      Hypertension Father      Tuberculosis Father      Lung cancer Mother's Sister      Other (Lung Surgery) Mother's Brother          Allergies  Codeine, Escitalopram, Hydromorphone, Hydroxyzine, Morphine, and Oxycodone-acetaminophen    Review of Systems  Review of Systems   Constitutional:  Negative for fever.   HENT:  Negative for ear pain.    Eyes:  Negative for pain.   Respiratory:  Negative for shortness of breath.    Cardiovascular:  Negative for chest pain.   Gastrointestinal:  Negative for blood in stool.   Endocrine: Negative for cold intolerance and heat intolerance.   Genitourinary:  Negative for difficulty urinating.   Musculoskeletal:  Negative for back pain and neck pain.   Skin:  Negative for rash.   Allergic/Immunologic: Negative for environmental allergies and food allergies.   Neurological:  Positive for numbness.   Hematological:  Negative for adenopathy.   Psychiatric/Behavioral:  Negative for suicidal ideas.         Physical Exam  Physical Exam  Constitutional:       Appearance: Normal appearance.   HENT:      Head: Normocephalic and atraumatic.   Eyes:      Extraocular Movements: Extraocular movements intact.      Pupils: Pupils are equal, round, and reactive to light.   Cardiovascular:      Rate and Rhythm: Normal rate and regular rhythm.   Pulmonary:      Effort: Pulmonary effort is normal.   Abdominal:      Palpations: Abdomen is soft.   Musculoskeletal:      Right lower leg: No edema.      Left lower leg: No edema.  "  Skin:     General: Skin is warm.   Neurological:      Mental Status: She is alert and oriented to person, place, and time.      Motor: Motor function is intact.      Coordination: Coordination is intact.      Gait: Gait is intact.      Deep Tendon Reflexes:      Reflex Scores:       Tricep reflexes are 2+ on the right side and 2+ on the left side.       Bicep reflexes are 2+ on the right side and 2+ on the left side.       Brachioradialis reflexes are 2+ on the right side and 2+ on the left side.       Patellar reflexes are 2+ on the right side and 2+ on the left side.       Achilles reflexes are 2+ on the right side and 2+ on the left side.  Psychiatric:         Mood and Affect: Mood normal.         Behavior: Behavior normal.           Last Recorded Vitals  Blood pressure 145/70, pulse 67, temperature 36.5 °C (97.7 °F), resp. rate 18, height 1.499 m (4' 11\"), weight 67.1 kg (148 lb), SpO2 97%.        Reviewed Labs   Latest Reference Range & Units 05/01/23 13:00   GLUCOSE 74 - 99 mg/dL 96   SODIUM 136 - 145 mmol/L 134 (L)   POTASSIUM 3.5 - 5.3 mmol/L 4.0   CHLORIDE 98 - 107 mmol/L 101   Bicarbonate 21 - 32 mmol/L 28   Anion Gap 10 - 20 mmol/L 9 (L)   Blood Urea Nitrogen 6 - 23 mg/dL 14   Creatinine 0.50 - 1.05 mg/dL 0.99   Calcium 8.6 - 10.3 mg/dL 8.9   (L): Data is abnormally low      Latest Reference Range & Units 05/01/23 13:00   WBC 4.4 - 11.3 x10E9/L 10.9   nRBC 0.0 - 0.0 /100 WBC 0.0   RBC 4.00 - 5.20 x10E12/L 4.00   HEMOGLOBIN 12.0 - 16.0 g/dL 11.6 (L)   HEMATOCRIT 36.0 - 46.0 % 36.4   (L): Data is abnormally low  Assessment/Plan   Encounter Diagnosis   Name Primary?    Carpal tunnel syndrome of left wrist         Jennifer Hdz is a 83 y.o. female here for evaluation of worsening pain of her left hand especially her thumb index and middle finger.  She has been previously diagnosed with carpal tunnel syndrome, status post carpal tunnel release surgery 6 weeks ago since then the pain has been worse.  " The pain is worse during night when she is trying to sleep gets better as the day progresses.  Currently taking gabapentin 100 mg 3 times daily with modest benefit.  She denies any significant side effects.  I would recommend upping the dose of gabapentin to 300 mg at bedtime continue with 200 mg twice daily during the daytime.  I would also trial her on splint at night.  Follow-up in 2 weeks for medication adjustment if needed.           Amador Sahu MD

## 2024-10-08 ENCOUNTER — TELEPHONE (OUTPATIENT)
Dept: PRIMARY CARE | Facility: CLINIC | Age: 84
End: 2024-10-08
Payer: MEDICARE

## 2024-10-08 DIAGNOSIS — N39.0 RECURRENT UTI: Primary | ICD-10-CM

## 2024-10-08 RX ORDER — NITROFURANTOIN MACROCRYSTALS 50 MG/1
50 CAPSULE ORAL NIGHTLY
Qty: 90 CAPSULE | Refills: 2 | Status: SHIPPED | OUTPATIENT
Start: 2024-10-08

## 2024-10-08 NOTE — TELEPHONE ENCOUNTER
Patient needs a refill (pt has a week left of medication) of Nitrofurantoin/50 mg/pharmacy: [facility].

## 2024-10-13 NOTE — PROGRESS NOTES
CHIEF COMPLAINT         Carpal tunnel follow-up    ASSESSMENT + PLAN    Persistent symptoms after left carpal tunnel release 4 weeks ago    At this point the wound is healing normally.  Motion is improving but not yet normalized.  We discussed options for management and agreed on taking the gabapentin and giving this more time for the swelling to come down.    Call my office if there is any worsening of the symptoms, but otherwise follow-up in 4 weeks for a clinical check.        HISTORY OF PRESENT ILLNESS       Patient returns today, now 4-1/2 weeks postop from left carpal tunnel release.  She is continuing to have some tingling and an unusual distribution after surgery.  The thumb is now okay out to the IP joint.  Just the tip of the thumb and radial and ulnar aspects of the index finger are bothersome.  The long finger is improving.  Ring finger has normalized.  She is prescribed gabapentin 100 mg 3 times daily.      PHYSICAL EXAM       She remains well-developed, well-nourished female in no acute distress.  She appears her stated age and has a pleasant affect.  Incision is well-healed and nontender.  Intact thenar motor function with 5/5 thumb abduction strength.  Intact IP flexion extension and good finger flexion extension, lacking just 1 cm of the crease.  Sensation intact to light touch by blinded confrontation as above.  Dorsal sensation is normal.  Small finger sensation is normal.  Capillary refill less than to seconds throughout.  2+ radial and ulnar pulses.      IMAGING / LABS / EMGs        None today      Electronically Signed      KARMA Mares MD      Orthopaedic Hand Surgery      [phone]

## 2024-10-21 ENCOUNTER — APPOINTMENT (OUTPATIENT)
Dept: PAIN MEDICINE | Facility: CLINIC | Age: 84
End: 2024-10-21
Payer: MEDICARE

## 2024-10-21 VITALS
RESPIRATION RATE: 18 BRPM | HEART RATE: 74 BPM | TEMPERATURE: 97.7 F | SYSTOLIC BLOOD PRESSURE: 163 MMHG | BODY MASS INDEX: 29.84 KG/M2 | DIASTOLIC BLOOD PRESSURE: 74 MMHG | OXYGEN SATURATION: 97 % | WEIGHT: 148 LBS | HEIGHT: 59 IN

## 2024-10-21 DIAGNOSIS — G56.02 CARPAL TUNNEL SYNDROME OF LEFT WRIST: Primary | ICD-10-CM

## 2024-10-21 DIAGNOSIS — M79.2 NEUROPATHIC PAIN: ICD-10-CM

## 2024-10-21 PROCEDURE — 1125F AMNT PAIN NOTED PAIN PRSNT: CPT | Performed by: ANESTHESIOLOGY

## 2024-10-21 PROCEDURE — 1123F ACP DISCUSS/DSCN MKR DOCD: CPT | Performed by: ANESTHESIOLOGY

## 2024-10-21 PROCEDURE — 99213 OFFICE O/P EST LOW 20 MIN: CPT | Performed by: ANESTHESIOLOGY

## 2024-10-21 PROCEDURE — 3078F DIAST BP <80 MM HG: CPT | Performed by: ANESTHESIOLOGY

## 2024-10-21 PROCEDURE — 1160F RVW MEDS BY RX/DR IN RCRD: CPT | Performed by: ANESTHESIOLOGY

## 2024-10-21 PROCEDURE — 1159F MED LIST DOCD IN RCRD: CPT | Performed by: ANESTHESIOLOGY

## 2024-10-21 PROCEDURE — 1157F ADVNC CARE PLAN IN RCRD: CPT | Performed by: ANESTHESIOLOGY

## 2024-10-21 PROCEDURE — 3077F SYST BP >= 140 MM HG: CPT | Performed by: ANESTHESIOLOGY

## 2024-10-21 ASSESSMENT — PAIN - FUNCTIONAL ASSESSMENT: PAIN_FUNCTIONAL_ASSESSMENT: 0-10

## 2024-10-21 ASSESSMENT — ENCOUNTER SYMPTOMS
BLOOD IN STOOL: 0
SHORTNESS OF BREATH: 0

## 2024-10-21 ASSESSMENT — PAIN DESCRIPTION - DESCRIPTORS: DESCRIPTORS: ACHING;THROBBING

## 2024-10-21 ASSESSMENT — PAIN SCALES - GENERAL
PAINLEVEL_OUTOF10: 4
PAINLEVEL_OUTOF10: 4

## 2024-10-21 NOTE — PROGRESS NOTES
Chief Complain    Follow-up (For post carpal tunnel syndrome pain, gabapentin is helping at the moment)    History Of Present Illness  Jennifer Hdz is a 83 y.o. female here for evaluation of left hand pain. The patient has been experiencing these symptoms for last 8 week(s). The patient describes the pain as numbing. The patient's current pain score is 3 on a scale from 0-10. The pain is worsened at night  and is alleviated by  moving around . Since the start of the symptoms the pain has been unchanged.    .  Past Medical History  She has a past medical history of Bunion of right foot, Cramp and spasm (12/23/2018), Enthesopathy, unspecified, Generalized anxiety disorder (08/30/2021), GERD (gastroesophageal reflux disease), Hypertension, Other amnesia (09/23/2021), Other bursitis of hip, left hip (08/05/2022), Other conditions influencing health status, Pain in unspecified hip (08/05/2022), Pain in unspecified knee (07/31/2015), Personal history of other diseases of the digestive system, Personal history of other specified conditions (06/11/2014), Personal history of urinary (tract) infections (12/04/2013), Personal history of urinary (tract) infections (06/04/2015), 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    Surgical History  She has a past surgical history that includes Shoulder surgery (03/25/2021) and Other surgical history (02/16/2022).    Social History  She reports that she has quit smoking. Her smoking use included cigarettes. She has never used smokeless tobacco. She reports current alcohol use of about 7.0 standard drinks of alcohol per week. She reports that she does not use drugs.    Family History  Family History   Problem Relation Name Age of  "Onset    Lung cancer Mother      Hypertension Father      Tuberculosis Father      Lung cancer Mother's Sister      Other (Lung Surgery) Mother's Brother          Allergies  Codeine, Escitalopram, Hydromorphone, Hydroxyzine, Morphine, and Oxycodone-acetaminophen    Review of Systems  Review of Systems   Respiratory:  Negative for shortness of breath.    Cardiovascular:  Negative for chest pain.   Gastrointestinal:  Negative for blood in stool.   Psychiatric/Behavioral:  Negative for suicidal ideas.         Physical Exam  Physical Exam  Constitutional:       Appearance: Normal appearance.   Eyes:      Extraocular Movements: Extraocular movements intact.      Pupils: Pupils are equal, round, and reactive to light.   Pulmonary:      Effort: Pulmonary effort is normal.   Neurological:      Mental Status: She is alert and oriented to person, place, and time.   Psychiatric:         Behavior: Behavior normal.           Last Recorded Vitals  Blood pressure 163/74, pulse 74, temperature 36.5 °C (97.7 °F), resp. rate 18, height 1.499 m (4' 11\"), weight 67.1 kg (148 lb), SpO2 97%.        Reviewed Labs   Latest Reference Range & Units 05/01/23 13:00   GLUCOSE 74 - 99 mg/dL 96   SODIUM 136 - 145 mmol/L 134 (L)   POTASSIUM 3.5 - 5.3 mmol/L 4.0   CHLORIDE 98 - 107 mmol/L 101   Bicarbonate 21 - 32 mmol/L 28   Anion Gap 10 - 20 mmol/L 9 (L)   Blood Urea Nitrogen 6 - 23 mg/dL 14   Creatinine 0.50 - 1.05 mg/dL 0.99   Calcium 8.6 - 10.3 mg/dL 8.9   (L): Data is abnormally low      Latest Reference Range & Units 05/01/23 13:00   WBC 4.4 - 11.3 x10E9/L 10.9   nRBC 0.0 - 0.0 /100 WBC 0.0   RBC 4.00 - 5.20 x10E12/L 4.00   HEMOGLOBIN 12.0 - 16.0 g/dL 11.6 (L)   HEMATOCRIT 36.0 - 46.0 % 36.4   (L): Data is abnormally low    Assessment/Plan   Encounter Diagnoses   Name Primary?    Carpal tunnel syndrome of left wrist Yes    Neuropathic pain           Jennifer Hdz is a 83 y.o. female here for evaluation of worsening pain of her left " hand especially her thumb index and middle finger.  She has been previously diagnosed with carpal tunnel syndrome, status post carpal tunnel release surgery 2 months ago.  The pain is worse during night when she is trying to sleep gets better as the day progresses.  Last visit we adjusted the dose of her gabapentin we increased it to 300 mg at night and 200 mg twice daily during the daytime.  Since then she reports significant improvement in her pain.  She is now able to sleep at night.  It has made her symptoms much more manageable.  She denies any significant side effects.  She denies any new neurological or constitutional symptoms.  I would continue with the current regimen.  Follow-up as needed.    Total time spent caring for the patient today was 21 minutes. This includes time spent before the visit reviewing the chart, time spent during the visit, and time spent after the visit on documentation.         Amador Sahu MD

## 2024-10-23 ENCOUNTER — OFFICE VISIT (OUTPATIENT)
Dept: ORTHOPEDIC SURGERY | Facility: CLINIC | Age: 84
End: 2024-10-23
Payer: MEDICARE

## 2024-10-23 DIAGNOSIS — G56.02 CARPAL TUNNEL SYNDROME ON LEFT: Primary | ICD-10-CM

## 2024-10-23 PROCEDURE — 1157F ADVNC CARE PLAN IN RCRD: CPT | Performed by: ORTHOPAEDIC SURGERY

## 2024-10-23 PROCEDURE — 1036F TOBACCO NON-USER: CPT | Performed by: ORTHOPAEDIC SURGERY

## 2024-10-23 PROCEDURE — 1160F RVW MEDS BY RX/DR IN RCRD: CPT | Performed by: ORTHOPAEDIC SURGERY

## 2024-10-23 PROCEDURE — 1123F ACP DISCUSS/DSCN MKR DOCD: CPT | Performed by: ORTHOPAEDIC SURGERY

## 2024-10-23 PROCEDURE — 1159F MED LIST DOCD IN RCRD: CPT | Performed by: ORTHOPAEDIC SURGERY

## 2024-10-23 PROCEDURE — 99211 OFF/OP EST MAY X REQ PHY/QHP: CPT | Performed by: ORTHOPAEDIC SURGERY

## 2024-10-31 ENCOUNTER — TELEPHONE (OUTPATIENT)
Dept: PRIMARY CARE | Facility: CLINIC | Age: 84
End: 2024-10-31

## 2024-10-31 ENCOUNTER — OFFICE VISIT (OUTPATIENT)
Dept: PAIN MEDICINE | Facility: CLINIC | Age: 84
End: 2024-10-31
Payer: MEDICARE

## 2024-10-31 VITALS
BODY MASS INDEX: 29.84 KG/M2 | RESPIRATION RATE: 18 BRPM | DIASTOLIC BLOOD PRESSURE: 74 MMHG | SYSTOLIC BLOOD PRESSURE: 152 MMHG | WEIGHT: 148 LBS | OXYGEN SATURATION: 98 % | TEMPERATURE: 97.2 F | HEART RATE: 67 BPM | HEIGHT: 59 IN

## 2024-10-31 DIAGNOSIS — G56.01 CARPAL TUNNEL SYNDROME OF RIGHT WRIST: ICD-10-CM

## 2024-10-31 DIAGNOSIS — G56.02 CARPAL TUNNEL SYNDROME OF LEFT WRIST: Primary | ICD-10-CM

## 2024-10-31 PROCEDURE — 1159F MED LIST DOCD IN RCRD: CPT | Performed by: ANESTHESIOLOGY

## 2024-10-31 PROCEDURE — 3078F DIAST BP <80 MM HG: CPT | Performed by: ANESTHESIOLOGY

## 2024-10-31 PROCEDURE — 3077F SYST BP >= 140 MM HG: CPT | Performed by: ANESTHESIOLOGY

## 2024-10-31 PROCEDURE — 1123F ACP DISCUSS/DSCN MKR DOCD: CPT | Performed by: ANESTHESIOLOGY

## 2024-10-31 PROCEDURE — 99213 OFFICE O/P EST LOW 20 MIN: CPT | Performed by: ANESTHESIOLOGY

## 2024-10-31 PROCEDURE — 1036F TOBACCO NON-USER: CPT | Performed by: ANESTHESIOLOGY

## 2024-10-31 PROCEDURE — 1157F ADVNC CARE PLAN IN RCRD: CPT | Performed by: ANESTHESIOLOGY

## 2024-10-31 PROCEDURE — 1125F AMNT PAIN NOTED PAIN PRSNT: CPT | Performed by: ANESTHESIOLOGY

## 2024-10-31 ASSESSMENT — ENCOUNTER SYMPTOMS
SHORTNESS OF BREATH: 0
BLOOD IN STOOL: 0
DEPRESSION: 0
FEVER: 0
OCCASIONAL FEELINGS OF UNSTEADINESS: 0
LOSS OF SENSATION IN FEET: 0

## 2024-10-31 ASSESSMENT — PAIN DESCRIPTION - DESCRIPTORS: DESCRIPTORS: ACHING;BURNING

## 2024-10-31 ASSESSMENT — PAIN - FUNCTIONAL ASSESSMENT: PAIN_FUNCTIONAL_ASSESSMENT: 0-10

## 2024-10-31 ASSESSMENT — PAIN SCALES - GENERAL
PAINLEVEL_OUTOF10: 3
PAINLEVEL_OUTOF10: 3

## 2024-11-04 DIAGNOSIS — K21.00 GASTROESOPHAGEAL REFLUX DISEASE WITH ESOPHAGITIS WITHOUT HEMORRHAGE: Primary | ICD-10-CM

## 2024-11-04 RX ORDER — OMEPRAZOLE 40 MG/1
40 CAPSULE, DELAYED RELEASE ORAL
Qty: 90 CAPSULE | Refills: 3 | Status: SHIPPED | OUTPATIENT
Start: 2024-11-04 | End: 2025-11-04

## 2024-11-04 NOTE — TELEPHONE ENCOUNTER
Pt needs new prescription for the omeprazole, Pt was off of meds for 6 days because pt thought she didn't need it. Put said pharmacy cancelled the prescription. Pt has a few pills left     Pt would like it sent to [facility] in [place] on [facility]

## 2024-11-18 DIAGNOSIS — G56.02 CARPAL TUNNEL SYNDROME OF LEFT WRIST: ICD-10-CM

## 2024-11-18 RX ORDER — GABAPENTIN 100 MG/1
200 CAPSULE ORAL 2 TIMES DAILY
Qty: 90 CAPSULE | Refills: 0 | Status: SHIPPED | OUTPATIENT
Start: 2024-11-18 | End: 2024-12-11

## 2024-11-22 ENCOUNTER — TELEPHONE (OUTPATIENT)
Dept: PAIN MEDICINE | Facility: CLINIC | Age: 84
End: 2024-11-22
Payer: MEDICARE

## 2024-11-22 DIAGNOSIS — G62.9 NEUROPATHY: Primary | ICD-10-CM

## 2024-11-22 RX ORDER — GABAPENTIN 300 MG/1
300 CAPSULE ORAL 3 TIMES DAILY
Qty: 270 CAPSULE | Refills: 0 | Status: SHIPPED | OUTPATIENT
Start: 2024-11-22 | End: 2025-02-20

## 2024-11-22 NOTE — TELEPHONE ENCOUNTER
Dr. Abebe patient needs medication corrected    She had called for a refill , and received the wrong strength. Patient requested gabapentin 300 mg  tid 90 pills and she received 100 mg of gabapentin , She takes 300 mg tid .

## 2025-01-02 DIAGNOSIS — I10 PRIMARY HYPERTENSION: ICD-10-CM

## 2025-01-02 RX ORDER — LOSARTAN POTASSIUM AND HYDROCHLOROTHIAZIDE 12.5; 1 MG/1; MG/1
1 TABLET ORAL DAILY
Qty: 90 TABLET | Refills: 3 | Status: SHIPPED | OUTPATIENT
Start: 2025-01-02

## 2025-01-07 ENCOUNTER — TELEPHONE (OUTPATIENT)
Dept: PRIMARY CARE | Facility: CLINIC | Age: 85
End: 2025-01-07
Payer: MEDICARE

## 2025-01-07 DIAGNOSIS — M54.50 ACUTE BILATERAL LOW BACK PAIN, UNSPECIFIED WHETHER SCIATICA PRESENT: Primary | ICD-10-CM

## 2025-01-07 NOTE — TELEPHONE ENCOUNTER
Meliza she went to the emergency room for what she thought was pain in her knee or hip she could hardly move, they gave her tramadol, took x rays, she is worse today and thinks it is her back, I put her on the schedule for 4:15 tomorrow, she wondered if you could put in an x ray for her back as well, someone will drive her to the appointment tomorrow

## 2025-01-07 NOTE — TELEPHONE ENCOUNTER
Spoke to her and faxed x ray order to [facility], she will get it done today before tomorrow's appointment

## 2025-01-08 ENCOUNTER — OFFICE VISIT (OUTPATIENT)
Dept: PRIMARY CARE | Facility: CLINIC | Age: 85
End: 2025-01-08
Payer: MEDICARE

## 2025-01-08 VITALS
DIASTOLIC BLOOD PRESSURE: 70 MMHG | WEIGHT: 150 LBS | OXYGEN SATURATION: 96 % | HEART RATE: 75 BPM | SYSTOLIC BLOOD PRESSURE: 158 MMHG | HEIGHT: 59 IN | BODY MASS INDEX: 30.24 KG/M2

## 2025-01-08 DIAGNOSIS — M70.62 TROCHANTERIC BURSITIS OF LEFT HIP: ICD-10-CM

## 2025-01-08 DIAGNOSIS — M54.50 ACUTE BILATERAL LOW BACK PAIN WITHOUT SCIATICA: Primary | ICD-10-CM

## 2025-01-08 DIAGNOSIS — G56.02 CARPAL TUNNEL SYNDROME OF LEFT WRIST: ICD-10-CM

## 2025-01-08 DIAGNOSIS — G56.01 CARPAL TUNNEL SYNDROME OF RIGHT WRIST: ICD-10-CM

## 2025-01-08 DIAGNOSIS — G83.10 PARESIS OF SINGLE LOWER EXTREMITY (MULTI): ICD-10-CM

## 2025-01-08 DIAGNOSIS — G95.9 DISEASE OF SPINAL CORD, UNSPECIFIED: ICD-10-CM

## 2025-01-08 DIAGNOSIS — I10 PRIMARY HYPERTENSION: ICD-10-CM

## 2025-01-08 DIAGNOSIS — M47.816 ARTHRITIS OF LUMBAR SPINE: ICD-10-CM

## 2025-01-08 DIAGNOSIS — M17.0 PRIMARY OSTEOARTHRITIS OF BOTH KNEES: ICD-10-CM

## 2025-01-08 DIAGNOSIS — M54.16 LUMBAR RADICULAR PAIN: ICD-10-CM

## 2025-01-08 PROBLEM — R42 VERTIGO: Status: RESOLVED | Noted: 2023-03-28 | Resolved: 2025-01-08

## 2025-01-08 PROBLEM — M25.559 JOINT PAIN, HIP: Status: RESOLVED | Noted: 2023-07-24 | Resolved: 2025-01-08

## 2025-01-08 PROBLEM — H61.22 IMPACTED CERUMEN OF LEFT EAR: Status: RESOLVED | Noted: 2023-03-28 | Resolved: 2025-01-08

## 2025-01-08 PROCEDURE — 1160F RVW MEDS BY RX/DR IN RCRD: CPT | Performed by: INTERNAL MEDICINE

## 2025-01-08 PROCEDURE — 1123F ACP DISCUSS/DSCN MKR DOCD: CPT | Performed by: INTERNAL MEDICINE

## 2025-01-08 PROCEDURE — 99214 OFFICE O/P EST MOD 30 MIN: CPT | Performed by: INTERNAL MEDICINE

## 2025-01-08 PROCEDURE — 1157F ADVNC CARE PLAN IN RCRD: CPT | Performed by: INTERNAL MEDICINE

## 2025-01-08 PROCEDURE — 1159F MED LIST DOCD IN RCRD: CPT | Performed by: INTERNAL MEDICINE

## 2025-01-08 PROCEDURE — G2211 COMPLEX E/M VISIT ADD ON: HCPCS | Performed by: INTERNAL MEDICINE

## 2025-01-08 PROCEDURE — 3077F SYST BP >= 140 MM HG: CPT | Performed by: INTERNAL MEDICINE

## 2025-01-08 PROCEDURE — 3078F DIAST BP <80 MM HG: CPT | Performed by: INTERNAL MEDICINE

## 2025-01-08 RX ORDER — METHYLPREDNISOLONE 4 MG/1
TABLET ORAL
Qty: 21 TABLET | Refills: 0 | Status: SHIPPED | OUTPATIENT
Start: 2025-01-08 | End: 2025-01-14

## 2025-01-08 RX ORDER — TRAMADOL HYDROCHLORIDE 50 MG/1
50 TABLET ORAL EVERY 6 HOURS PRN
COMMUNITY
Start: 2025-01-06

## 2025-01-08 NOTE — PROGRESS NOTES
Subjective   Patient ID: Jennifer Hdz is a 84 y.o. female who presents for evaluation for back pain       Her back has been hurting the past month especially when lying down   She admits she fell a few weeks ago. Her apple watch detected her fall.   She was in her living room in her home. She was cleaning an area on her rug from her cat.   She was bending over holding something to stabilize her but it moved and she fell   She landed on her side and for a while she thought she broke a rib  She has to clean up after her cat a lot.   She got down this morning to get up the tree skirt and almost could not get back up   She admits to climbing to get into her kitchen cabinets 5- 6 times a day     She has never felt old until she hit age 81  She is taking Prevagen daily         HEALTH  PAP 7/05 and no longer needs to repeat   Mammo 12/18, 12/19, 9/20, 6/22, 7/23, ordered 2/2024   BD 1/14 T-2.7, 1/17 T-2.2, 1/20 T-1.8, 6/22 T-1.3, ordered 2/2024   Colon 2006 polyps, 5/11 polyp, 12/17 polyp so Q 1 poor prep, ordered 5/2022   EKG 8/17, 12/18, 10/20, 3/21, 4/23 ED  Urine  12/15, 12/17, 6/18, 12/18, 12/19   Flu 12/19, 10/20, 9/21, 11/22, 11/23  TDAP ? and will update with injury   Pneumovax 2012 last one   Prevnar 12/15, 1/16  Zostavax 2007  Shingrix 5/1/19 and 2/7/19   Moderna CVD 2/18/2021 and 3/11/2021 booster 12/4/2021  Ophth- She is seeing someone at Dr Andres's office.         Review of Systems  All systems negative except those listed in the HPI      Objective   Visit Vitals  /70 (BP Location: Left arm, Patient Position: Sitting, BP Cuff Size: Adult)   Pulse 75    Body mass index is 30.3 kg/m².      Physical Exam  Vitals reviewed. Exam conducted with a chaperone present.   Constitutional:       Appearance: Normal appearance.   HENT:      Head: Normocephalic.      Right Ear: Tympanic membrane, ear canal and external ear normal.      Left Ear: Tympanic membrane, ear canal and external ear normal.      Nose:  Nose normal.      Mouth/Throat:      Pharynx: Oropharynx is clear.   Eyes:      Conjunctiva/sclera: Conjunctivae normal.   Cardiovascular:      Rate and Rhythm: Normal rate and regular rhythm.      Pulses: Normal pulses.      Heart sounds: Normal heart sounds.   Pulmonary:      Effort: Pulmonary effort is normal.      Breath sounds: Normal breath sounds.   Abdominal:      General: Bowel sounds are normal.      Palpations: Abdomen is soft.   Musculoskeletal:         General: Normal range of motion.      Cervical back: Normal range of motion and neck supple.   Skin:     General: Skin is warm.   Neurological:      General: No focal deficit present.      Mental Status: She is alert and oriented to person, place, and time.      Comments: no neurological deficits noted, no foot drop   Psychiatric:         Mood and Affect: Mood normal.         Behavior: Behavior normal.         Thought Content: Thought content normal.         Judgment: Judgment normal.       Assessment/Plan   Problem List Items Addressed This Visit    None  Visit Diagnoses       Acute bilateral low back pain without sciatica    -  Primary    Relevant Medications    methylPREDNISolone (Medrol Dospak) 4 mg tablets             Follow up completed      She is  with 3 children. She has a previous history of tobacco use   Her  had dementia (not NPH) and passed in 2022.   She was  36 years. She has good support with her family and friends   Her son does her finances, he goes to her house weekly      Seen at  ED 1/6/25 due to acute bilat lower back pain after fall: On exam: no neurological deficits noted, no foot drop 1/25. Explained to patient the pain is from her back and not her hip 1/25.   Her back has been hurting the past month especially when lying down   She admits she fell a few weeks ago. Her apple watch detected her fall.   She was in her living room in her home.   She was cleaning an area on her rug from her cat.   She was  bending over holding something to stabilize her but it moved and she fell   She landed on her side and for a while she thought she broke a rib   She has to clean up after her cat a lot.   She got down this morning to get up the tree skirt and almost could not get back up  She admits to climbing to get into her kitchen cabinets 5- 6 times a day    Xray L/S 1/25 showed retrolisthesis L1- 2, no change since 12/22. 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   Explained she needs to rethink her procedure for cleaning up after the cat to avoid falling. We discussed this in detail today 1/25.   Recommend she modify her daily movements to avoid injuring her back   She was given Tramadol in the hospital and still has some left   Prescription sent in for Medrol Dose Chris to use as directed, possible side effects discussed with medication   She saw Dr Kaye in 11/2024 and had left hip bursa injection.   If NI with the steroids, recommend she call Dr Kaye for another bursa injection   Recommend core strengthening exercises   Demonstrated exercises she can do at home before getting up/before going to bed   Recommend she separate her walking to 2 miles in the morning and 2 miles in the afternoon.     Cervicalgia and back pain:    She had nerve blocks on 1/21/2022 with Dr Espinoza.   She has tried many different modalities to control pain, they have all failed   MRI L/S 12/2021 severe spinal stenosis lower spine, worse on left side.   MRI of C/S 2/2022 showed multilevel degenerative changes of the cervical spine. Central canal stenosis is most pronounced at C5- 6. There is multilevel neuroforaminal narrowing due to facet and uncovertebral joint hypertrophy  Xray L/S 1/25 showed retrolisthesis L1- 2, no change since 12/22. 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    EMG  2/2024 showed chronic CAT- 1 radiculopathy and carpal tunnel bilat   - S/p back surgery on 3 vertebrae (L3,4, and L5) with   Jennifer 4/21/2022.   Continue gabapentin 300 mg BID and tizanidine 4 mg prn.    She is no longer taking Tramadol   Orders placed for ortho evaluation 5/2024   Recommend core strengthening exercises      She has experienced tinnitus for years.  This is not problematic for her  She went to [facility] for hearing aids bilaterally     Her weight in office is 148 with BMI of 28.89. We spent 15 minutes discussing diet and weight loss. The struggle of weight loss persists  Explained goal for BMI to be 25 or less    Recommend she look into a plant based/ whole foods diet      HTN: BP Stable   Continue losartan/HCTZ 100/12.5 mg daily   EKG 4/23 SR rate of 87, left axis, IA interval 150, QTc 450. Frequent PACs.   Stress test 2017 was normal   Continue to monitor BP at home and call with elevated readings      I have spent 15 min face to face with this patient discussing their cardiac risk   We discussed the patients cardiovascular risk. If needed, lifestyle modifications recommended including: behavioral therapies of nutrition choices, exercise and eliminate habits that are contributing to their cardiac risk. We agreed to a plan to decrease his cardiovascular risks. Discussed ASA. Reviewed Guidelines and approved recommendations made to patient.   The patient's 10 yr CV risk was estimated at : ACO score 4/6 IO 1/25     Elevated lipids:   Explained goal for LDL to be less than 100 and ideally less than 70   Recommend she look into a plant based/ whole foods diet       Hypothyroid:   Continue levothyroxine 112 mcg a day.    She saw endo but there were no recommendations that were new      Left sided TMJ:   She follows with her dentist routinely      Concerns for memory issues: She is taking OTC Prevagen   There are times she has difficulty remembering words   She has made plans with friends but forgets about the plans   She has not been driving and forgetting where she is going  She has no issues with leaving things running around  the house   She keeps activities logged on a calendar and keeps a notebook close by   She reads a lot and does puzzle books.     Situational Depression/ insomnia:   She is no longer seeing a therapist   Her  passed in 2022, they were  35 years.   Her stress got to a level that she disassociated when her  passed   Her son is doing her finances for her   Her KIRAN passed away from ALS in 8/18   She exercises to help relieve her stress.   She admits weight gain exacerbates her depressive Sx.   She likes to be in control of her environment   She could not tolerate Lexapro   Discontinued Remeron and Klonopin since it did not help.     Recurrent leg cramps:  She started eating potassium enriched foods and this has helped  She has good relief with yellow mustard prn     Bilateral varicose veins and spider veins right> left   Recommend wearing support stockings during the day   Offered patient appt with a vascular surgeon- she declines for now 11/2023      Recurrent UTI- No recent UTIs to report   UTI 9/20/2021, 10/4/2021 and 10/12/2021, 7/2023.   Continue Macrobid 50 mg daily and restart D- Mannose w/ cranberry daily.    Continue Pyridium to use prn and Estrace cream QOD   Continue Myrbetriq 50 mg daily for OAB   CT urogram and cystoscopy in the past with Dr Jonah DICKSON 7/2023 normal. Urine cultures 7/2023 negative   She saw BERT Sandoval in 7/2023       Left hip /arthritis issues:   She saw ortho 7/2022 and had a nerve block but it did not help   S/p LTH anterior approach 10/5/2022 with Dr Perla   She had a bursa injection and it did not help 2022  She saw a different ortho physician and had injection to the bursa 12/2022   She saw Dr Kaye in 11/2024 and had left hip bursa injection      Right shoulder complete reversal on 10/5/2020:   She saw Dr Meza for a second opinion and told she fractured her acromion   She was told there is nothing more that can be done for her right shoulder   She completed PT  and doing the exercises at home     -s/p right carpel tunnel release with Dr Mares in 6/24/24      - s/p left carpal tunnel release with Dr Mares on 8/26/24      She is right hand dominant       EMG  2/2024 studies showed chronic CAT- 1 radiculopathy and carpal tunnel bilat       She saw Dr Mares in 9/24 and healing well. I think at this point there is little to do       but wait.  If things have not significantly improved at the 6-month postop point (late       February), then evaluation with neuromuscular ultrasound would be an option       She saw Dr Sahu in 10/24 for worsening pain of her left hand especially her thumb       index and middle finger.       She saw Dr Sahu in 10/24 and increase gabapentin to 300 mg BID       She saw Dr Torres in 12/24 for bilateral hand pain and weakness - she had a       steroid injection to the Rt trigger thumb      Dermatology: she has multiple senile keratosis, nothing suspicious on exam 5/2024  She had BCC removed from her forehead on 2/11/2022  She had BCC removed on 2/18/2022  She has a raised hard lesion on the left elbow: Concerned this is SCC.  Recommend she see dermatology 3/2024. She will call and make an appt 3/2024      Recurrent Cold sores:  She has cold sores because of stress. She states she has a history of cold sores.  She is using L-lysine and Herpecin for cold sores  Continue Valtrex 1 GM to take as directed with onset of cold sore      Vitamin D def: Levels 36 on labs in 6/2022  Continue OTC Vitamin D3 2000 UT daily      Pap no longer needs to repeat    Mammo at [facility] + genet in 7/2023 was negative and ordered 2/2024   Breast exam normal 2/2024      BD 6/2022 T-1.3. Continue OTC Ca 600 mg BID, OTC Vitamin D3 2000 UT daily and eat 2 servings of calcium enriched foods daily. Discussed importance of weight bearing exercises. FRAX score was only 14.   Reclast last injection in 12/17.     Colonoscopy 12/17 +polyp and orders placed 5/2022 but not done.  She will call GI and make an appt to see if she needs to do the colonoscopy again due to age 2/2024     Ophth:  She is seeing someone at Dr Andres's office, she was seen in 2019.   She goes Q 2 years for Rx check.   She will have her next eye exam results faxed to my office in order to update her medical records.      Her daughter Lizeth is her MPOA.   Copy of her Advanced Directives scanned in her chart 5/2023        Flu 12/19, 10/20, 9/21, 11/22, 11/23  TDAP ? and will update with injury   Pneumovax 2012 last one   Prevnar 12/15, 1/16  Zostavax 2007  Shingrix 5/1/19 and 2/7/19   Moderna CVD 2/18/2021 and 3/11/2021 booster 12/4/2021      Some elements in the chart were copied from Dr. Azul's last office visit with patient. Notes have been updated where appropriate, and reflect my current medical decision making from today.     RTC in 2/25 for MCR or sooner if needed    (MCR due 2/2025)      Scribe Attestation  By signing my name below, I, Amy Mojicard , Scribe   attest that this documentation has been prepared under the direction and in the presence of Darlene Azul MD.

## 2025-01-08 NOTE — RESULT ENCOUNTER NOTE
Fortino Mckinney -  The lumber spine seems stable from before but there is severe degenerative arthritis changes

## 2025-01-09 PROBLEM — G56.02 CARPAL TUNNEL SYNDROME ON LEFT: Status: RESOLVED | Noted: 2024-07-18 | Resolved: 2025-01-09

## 2025-01-09 PROBLEM — G56.01 CARPAL TUNNEL SYNDROME ON RIGHT: Status: RESOLVED | Noted: 2024-06-19 | Resolved: 2025-01-09

## 2025-02-16 NOTE — PROGRESS NOTES
Subjective   Reason for Visit: Jennifer Hdz is an 84 y.o. female here for her Subsequent Medicare Assessment, annual physical and follow up             She would like to have the influenza vaccine today while she is in the office     She had terrible pain post op left carpel tunnel surgery   Her thumb is still numb and a couple fingers are still half numb.  Dr Mares sent her to pain management and they increased her gabapentin  She is still in pain in the left hand. She drops things.   Pain management told her she is going to have to live with the nerve damage in the left hand indefinitely.        HEALTH  PAP 7/05 and no longer needs to repeat   Mammo 12/18, 12/19, 9/20, 6/22, 7/23, ordered 2/24 and again 2/25   BD 1/14 T-2.7, 1/17 T-2.2, 1/20 T-1.8, 6/22 T-1.3, ordered 2/2024 and 2/25  Colon 2006 polyps, 5/11 polyp, 12/17 polyp so Q 1 poor prep, declines repeating the colonoscopy   EKG 8/17, 12/18, 10/20, 3/21, 4/23, 2/25  Urine  12/15, 12/17, 6/18, 12/18, 12/19   Flu 12/19, 10/20, 9/21, 11/22, 11/23, 2/25  TDAP ? and will update with injury   Pneumovax 2012 last one   Prevnar 12/15, 1/16  Zostavax 2007  Shingrix 5/1/19 and 2/7/19   SARS-CoV-2-  2/21, 3/21, 12/21  Ophth- She saw Dr Urrutia in 11/24. No glaucoma or MD       Patient Care Team:  Darlene Azul MD as PCP - General  Darlene Azul MD as PCP - [employer]age PCP     Review of Systems  All systems negative except those listed in the HPI      Objective   Visit Vitals  /64   Pulse 72   Temp 36.7 °C (98 °F)   Resp 16    Body mass index is 30.3 kg/m².      Visit Vitals  /68   Pulse 72   Temp 36.7 °C (98 °F)   Resp 16    Recheck BP by Dr Darlene Azul MD 2/17/25    Physical Exam  Vitals reviewed.   Constitutional:       Appearance: Normal appearance. She is obese.   HENT:      Head: Normocephalic.      Right Ear: Tympanic membrane, ear canal and external ear normal.      Left Ear: Tympanic membrane, ear canal and  external ear normal.      Nose: Nose normal.      Mouth/Throat:      Mouth: Mucous membranes are dry.      Pharynx: Oropharynx is clear.   Eyes:      Conjunctiva/sclera: Conjunctivae normal.   Cardiovascular:      Rate and Rhythm: Normal rate and regular rhythm.      Pulses: Normal pulses.      Heart sounds: Normal heart sounds.   Pulmonary:      Effort: Pulmonary effort is normal.      Breath sounds: Normal breath sounds.   Abdominal:      General: Bowel sounds are normal.      Palpations: Abdomen is soft.   Musculoskeletal:         General: Normal range of motion.      Cervical back: Normal range of motion and neck supple.      Comments: 4th finger left hand does not close fully    Skin:     General: Skin is warm.      Comments: she has multiple senile keratosis, nothing suspicious on exam   Neurological:      General: No focal deficit present.      Mental Status: She is alert and oriented to person, place, and time.   Psychiatric:         Mood and Affect: Mood normal.         Behavior: Behavior normal.         Thought Content: Thought content normal.         Judgment: Judgment normal.       Assessment & Plan  Dyslipidemia    Orders:    CBC; Future    Comprehensive Metabolic Panel; Future    Lipid Panel; Future    ECG 12 Lead    Hypothyroidism, unspecified type    Orders:    TSH with reflex to Free T4 if abnormal; Future       Problem List Items Addressed This Visit       Dyslipidemia - Primary                 Relevant Orders    CBC    Comprehensive Metabolic Panel    Lipid Panel    ECG 12 Lead (Completed)    Esophageal reflux    Hypertension    Hypothyroidism                 Relevant Orders    TSH with reflex to Free T4 if abnormal    Recurrent cold sores    Spinal stenosis, lumbar     Other Visit Diagnoses       Routine general medical examination at health care facility        Relevant Orders    1 Year Follow Up In [facility] - PCP - Wellness Exam    Visit for screening mammogram        Relevant  Orders    BI mammo bilateral screening tomosynthesis    Asymptomatic menopausal state        Relevant Orders    XR DEXA bone density           Subsequent Medicare Assessment, annual physical and follow up completed   Labs ordered      Medicare Wellness completed  -  Discussed healthy diet and regular exercise.    -  Physical exam overall unremarkable. Immunizations reviewed and updated accordingly. Healthy lifestyle choices discussed (tobacco avoidance, appropriate alcohol use, avoidance of illicit substances).   -  Patient is wearing seatbelt.   -  Screening lab work ordered as indicated.    -  Age appropriate screening tests reviewed with patient.         We spent 15 minutes discussing depression screen and there is nothing found that is of concern for underling depression. The PQH form was filled and the meds reviewed. No depression to report      We spent 15 minutes discussing alcohol use and there are no concerns about overuse. The 15 min was spent in going over any issues of use of alcohol. None      She has grab bars in the shower.  She has fallen recently and no risk of falls in the house   She has good lighting around the house and functioning smoke detectors.     She is  with 3 children. She has a previous history of tobacco use   Her  had dementia (not NPH) and passed in 2022.   She was  35 years. She has good support with her family and friends   Her son does her finances, he goes to her house weekly      Seen at  ED 1/6/25 due to acute bilat lower back pain after fall:   Recommend she modify her daily movements to avoid injuring her back   She was given Tramadol in the hospital and still has some left   She completed a Medrol Dose Chris   Xray L/S 1/25 retrolisthesis L1- 2, no change since 12/22. 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   She saw Dr Kaye in 11/2024 and had left hip bursa injection.   If NI with the steroids, recommend she call Dr Kaye for  another bursa injection   Recommend core strengthening exercises   Demonstrated exercises she can do at home       She has experienced tinnitus for years.  This is not problematic for her  She went to [facility] for hearing aids bilaterally     Her weight in office is 150 with BMI of 30.30. We spent 15 minutes discussing diet and weight loss. The struggle of weight loss persists  I would like to see her BMI below 30.  Recommend she look into a plant based/ whole foods diet      HTN: BP not ideal on arrival but improved with repeat 2/25   Continue losartan/HCTZ 100/12.5 mg daily   EKG 2/25 NSR, rate 65 bpm, no LVH or strain patter noted   Stress test 2017 was normal   Continue to monitor BP at home and call with elevated readings      I have spent 15 min face to face with this patient discussing their cardiac risk   We discussed the patients cardiovascular risk. If needed, lifestyle modifications recommended including: behavioral therapies of nutrition choices, exercise and eliminate habits that are contributing to their cardiac risk. We agreed to a plan to decrease his cardiovascular risks. Discussed ASA. Reviewed Guidelines and approved recommendations made to patient.   The patient's 10 yr CV risk was estimated at : ACO score 4/6 IO 2/25      Elevated lipids: Labs ordered and we will adjust if indicated  2/25  Explained goal for LDL to be less than 100 and ideally less than 70   Recommend she look into a plant based/ whole foods diet       Hypothyroid: Labs ordered and we will adjust if indicated  2/25  Continue levothyroxine 112 mcg a day.    She saw endo but there were no recommendations that were new      Left sided TMJ:   She follows with her dentist routinely      Concerns for memory issues:   There are times she has difficulty remembering words   She has made plans with friends but forgets about the plans   She has not been driving and forgetting where she is going  She has no issues with leaving things running  around the house   She keeps activities logged on a calendar and keeps a notebook close by   She reads a lot and does puzzle books.  She is taking OTC Prevagen      Recurrent leg cramps:  She started eating potassium enriched foods and this has helped  She has good relief with yellow mustard prn     Bilateral varicose veins and spider veins right> left   Recommend wearing support stockings during the day   Offered patient appt with a vascular surgeon- she declines for now 11/2023      Situational Depression/ insomnia:  She could not tolerate Lexapro   Discontinued Remeron and Klonopin since it did not help.   She is no longer seeing a therapist   Her  passed in 2022, they were  35 years.   Her stress got to a level that she disassociated when her  passed   Her son is doing her finances for her   Her KIRAN passed away from ALS in 8/18   She exercises to help relieve her stress.   She admits weight gain exacerbates her depressive Sx.   She likes to be in control of her environment      Recurrent UTI- No recent UTIs to report   UTI 9/20/2021, 10/4/2021 and 10/12/2021, 7/2023.   Continue Macrobid 50 mg daily and restart D- Mannose w/ cranberry daily.    Continue Pyridium to use prn and Estrace cream QOD   Continue Myrbetriq 50 mg daily for OAB   CT urogram and cystoscopy in the past with Dr Jonah DICKSON 7/2023 normal. Urine cultures 7/2023 negative   She saw BERT Sandoval in 7/2023      Cervicalgia and back pain:    Continue gabapentin 300 mg TID     She is no longer taking Tramadol    She has tried many different modalities to control pain, they have all failed   MRI L/S 12/2021 severe spinal stenosis lower spine, worse on left side.   MRI of C/S 2/2022 showed multilevel degenerative changes of the cervical spine. Central canal stenosis is most pronounced at C5- 6. There is multilevel neuroforaminal narrowing due to facet and uncovertebral joint hypertrophy  Xray L/S 1/25 showed retrolisthesis L1- 2, no  change since 12/22. 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    EMG  2/2024 showed chronic CAT- 1 radiculopathy and carpal tunnel bilat   - S/p nerve blocks on 1/21/2022 with Dr Espinoza.    - S/p back surgery on 3 vertebrae (L3,4, and L5) with Dr Rodriguez 4/21/2022.   Orders placed for ortho evaluation 5/2024   Recommend core strengthening exercises       Left hip /arthritis issues:   She saw ortho 7/2022 and had a nerve block but it did not help   - S/p LTH anterior approach 10/5/2022 with Dr Perla   She saw Dr Kaye in 11/2024 and had left hip bursa injection      Right shoulder complete reversal on 10/5/2020:   She saw Dr Meza for a second opinion and told she fractured her acromion   She was told there is nothing more that can be done for her right shoulder   She completed PT and doing the exercises at home      Wrist pain:; On exam: 4th finger left hand does not close fully 2/25. She had terrible pain post op left carpel tunnel surgery. The Tramadol did not help her pain so she stopped taking them. Her thumb is still numb and a couple fingers are still half numb. Dr Mares sent her to pain management and they increased her gabapentin. She is still in pain in the left hand. She drops things. Pain management told her she is going to have to live with the nerve damage in the left hand indefinitely.    Continue gabapentin 300 mg TID   - s/p right carpel tunnel release with Dr Mares in 6/24/24      - s/p left carpal tunnel release with Dr Mares on 8/26/24      She is right hand dominant       EMG  2/2024 studies showed chronic CAT- 1 radiculopathy and carpal tunnel bilat       She saw Dr Sahu in 10/24        She saw Dr Torres in 12/24 for bilateral hand pain and weakness - she had a       steroid injection to the Rt trigger thumb       She would like for me to take over her refills for gabapentin      Dermatology: she has multiple senile keratosis, nothing suspicious on exam 2/25  - S/p  BCC removed from her forehead on 2/11/2022  - S/p BCC removed on 2/18/2022  She has a raised hard lesion on the left elbow: Concerned this is SCC.  Recommend she see dermatology 3/2024. She will call and make an appt 3/2024      Recurrent Cold sores:  She has cold sores because of stress. She states she has a history of cold sores.  She is using L-lysine and Herpecin for cold sores  Continue Valtrex 1 GM to take as directed with onset of cold sore      Vitamin D def: Levels 36 on labs in 6/2022  Continue OTC Vitamin D3 2000 UT daily      Pap no longer needs to repeat    Mammo at [facility] in 7/2023 was negative and ordered 2/24 and again 2/25   Breast exam normal 2/2025     BD 6/2022 T-1.3 and ordered 2/25. Continue OTC Ca 600 mg BID, OTC Vitamin D3 2000 UT daily and eat 2 servings of calcium enriched foods daily.   Discussed importance of weight bearing exercises.    Reclast last injection in 12/17.      Colonoscopy 12/17 polyp so Q 1 poor prep. Declines repeating the colonoscopy       Ophth:  She saw Dr Urrutia in 11/24. No glaucoma or MD   She will have her next eye exam results faxed to my office in order to update her medical records.      I spent 15 min with the patient discussing their wishes for end of life choices.   We discussed the need for a Living Will and that wishes should be discussed with Family. The DNR status was reviewed, and we discussed the options of this and, the DNR _CC options as well.   We also went over how important it was to have these choices written down and clear for any surviving family so that their wishes are followed   The patient and I came to to following agreement :   Her daughter Lizeth is her MPOA.   Copy of her Advanced Directives scanned in her chart 5/2023        Flu 12/19, 10/20, 9/21, 11/22, 11/23, 2/25   TDAP ? and will update with injury   Pneumovax 2012 last one   Prevnar 12/15, 1/16  Zostavax 2007  Shingrix 5/1/19 and 2/7/19   SARS-CoV-2-  2/21, 3/21, 12/21       Some elements in the chart were copied from Dr. Azul's last office visit with patient. Notes have been updated where appropriate, and reflect my current medical decision making from today.     RTC in 6 months for follow up or sooner if needed    (MCR due 2/26, last mcr 2/17/25)      Scribe Attestation  By signing my name below, I, Amy Greer , Scribe   attest that this documentation has been prepared under the direction and in the presence of Darlene Azul MD.

## 2025-02-16 NOTE — ASSESSMENT & PLAN NOTE
Orders:    CBC; Future    Comprehensive Metabolic Panel; Future    Lipid Panel; Future    ECG 12 Lead

## 2025-02-17 ENCOUNTER — APPOINTMENT (OUTPATIENT)
Dept: PRIMARY CARE | Facility: CLINIC | Age: 85
End: 2025-02-17
Payer: MEDICARE

## 2025-02-17 VITALS
HEART RATE: 72 BPM | OXYGEN SATURATION: 96 % | RESPIRATION RATE: 16 BRPM | WEIGHT: 150 LBS | SYSTOLIC BLOOD PRESSURE: 134 MMHG | HEIGHT: 59 IN | BODY MASS INDEX: 30.24 KG/M2 | TEMPERATURE: 98 F | DIASTOLIC BLOOD PRESSURE: 68 MMHG

## 2025-02-17 DIAGNOSIS — N95.2 ATROPHIC VAGINITIS: ICD-10-CM

## 2025-02-17 DIAGNOSIS — B00.1 RECURRENT COLD SORES: ICD-10-CM

## 2025-02-17 DIAGNOSIS — E78.5 DYSLIPIDEMIA: Primary | ICD-10-CM

## 2025-02-17 DIAGNOSIS — I10 PRIMARY HYPERTENSION: ICD-10-CM

## 2025-02-17 DIAGNOSIS — Z78.0 ASYMPTOMATIC MENOPAUSAL STATE: ICD-10-CM

## 2025-02-17 DIAGNOSIS — G56.02 CARPAL TUNNEL SYNDROME OF LEFT WRIST: ICD-10-CM

## 2025-02-17 DIAGNOSIS — M48.061 SPINAL STENOSIS OF LUMBAR REGION WITHOUT NEUROGENIC CLAUDICATION: ICD-10-CM

## 2025-02-17 DIAGNOSIS — Z12.31 VISIT FOR SCREENING MAMMOGRAM: ICD-10-CM

## 2025-02-17 DIAGNOSIS — E03.9 HYPOTHYROIDISM, UNSPECIFIED TYPE: ICD-10-CM

## 2025-02-17 DIAGNOSIS — N39.0 RECURRENT UTI: ICD-10-CM

## 2025-02-17 DIAGNOSIS — Z00.00 ROUTINE GENERAL MEDICAL EXAMINATION AT HEALTH CARE FACILITY: ICD-10-CM

## 2025-02-17 DIAGNOSIS — K21.00 GASTROESOPHAGEAL REFLUX DISEASE WITH ESOPHAGITIS WITHOUT HEMORRHAGE: ICD-10-CM

## 2025-02-17 PROBLEM — F43.9 SITUATIONAL STRESS: Status: RESOLVED | Noted: 2023-03-28 | Resolved: 2025-02-17

## 2025-02-17 PROCEDURE — 90656 IIV3 VACC NO PRSV 0.5 ML IM: CPT | Performed by: INTERNAL MEDICINE

## 2025-02-17 PROCEDURE — G0439 PPPS, SUBSEQ VISIT: HCPCS | Performed by: INTERNAL MEDICINE

## 2025-02-17 PROCEDURE — 99397 PER PM REEVAL EST PAT 65+ YR: CPT | Performed by: INTERNAL MEDICINE

## 2025-02-17 PROCEDURE — G0008 ADMIN INFLUENZA VIRUS VAC: HCPCS | Performed by: INTERNAL MEDICINE

## 2025-02-17 PROCEDURE — 1160F RVW MEDS BY RX/DR IN RCRD: CPT | Performed by: INTERNAL MEDICINE

## 2025-02-17 PROCEDURE — 1157F ADVNC CARE PLAN IN RCRD: CPT | Performed by: INTERNAL MEDICINE

## 2025-02-17 PROCEDURE — G0446 INTENS BEHAVE THER CARDIO DX: HCPCS | Performed by: INTERNAL MEDICINE

## 2025-02-17 PROCEDURE — 3075F SYST BP GE 130 - 139MM HG: CPT | Performed by: INTERNAL MEDICINE

## 2025-02-17 PROCEDURE — 1170F FXNL STATUS ASSESSED: CPT | Performed by: INTERNAL MEDICINE

## 2025-02-17 PROCEDURE — 1123F ACP DISCUSS/DSCN MKR DOCD: CPT | Performed by: INTERNAL MEDICINE

## 2025-02-17 PROCEDURE — G0444 DEPRESSION SCREEN ANNUAL: HCPCS | Performed by: INTERNAL MEDICINE

## 2025-02-17 PROCEDURE — 1158F ADVNC CARE PLAN TLK DOCD: CPT | Performed by: INTERNAL MEDICINE

## 2025-02-17 PROCEDURE — 1159F MED LIST DOCD IN RCRD: CPT | Performed by: INTERNAL MEDICINE

## 2025-02-17 PROCEDURE — 93000 ELECTROCARDIOGRAM COMPLETE: CPT | Performed by: INTERNAL MEDICINE

## 2025-02-17 PROCEDURE — 1036F TOBACCO NON-USER: CPT | Performed by: INTERNAL MEDICINE

## 2025-02-17 PROCEDURE — 99214 OFFICE O/P EST MOD 30 MIN: CPT | Performed by: INTERNAL MEDICINE

## 2025-02-17 PROCEDURE — 3078F DIAST BP <80 MM HG: CPT | Performed by: INTERNAL MEDICINE

## 2025-02-17 RX ORDER — VITAMIN B COMPLEX
1 CAPSULE ORAL DAILY
COMMUNITY

## 2025-02-17 ASSESSMENT — ACTIVITIES OF DAILY LIVING (ADL)
TAKING_MEDICATION: INDEPENDENT
GROCERY_SHOPPING: INDEPENDENT
BATHING: INDEPENDENT
MANAGING_FINANCES: INDEPENDENT
DOING_HOUSEWORK: INDEPENDENT
DRESSING: INDEPENDENT

## 2025-02-17 NOTE — PROGRESS NOTES
"Subjective   Reason for Visit: Jennifer Hdz is an 84 y.o. female here for a Medicare Wellness visit.     Past Medical, Surgical, and Family History reviewed and updated in chart.    Reviewed all medications by prescribing practitioner or clinical pharmacist (such as prescriptions, OTCs, herbal therapies and supplements) and documented in the medical record.    HPI    Patient Care Team:  Darlene Azul MD as PCP - General  Darlene Azul MD as PCP - [employer] Medicare Advantage PCP     Review of Systems    Objective   Vitals:  /64   Pulse 72   Temp 36.7 °C (98 °F)   Resp 16   Ht 1.499 m (4' 11\")   Wt 68 kg (150 lb)   LMP  (LMP Unknown)   SpO2 96%   BMI 30.30 kg/m²       Physical Exam    Assessment & Plan  Dyslipidemia    Orders:    CBC; Future    Comprehensive Metabolic Panel; Future    Lipid Panel; Future    ECG 12 Lead    Hypothyroidism, unspecified type    Orders:    TSH with reflex to Free T4 if abnormal; Future    Routine general medical examination at health care facility    Orders:    1 Year Follow Up In [facility] - PCP - Wellness Exam; Future    Primary hypertension         Gastroesophageal reflux disease with esophagitis without hemorrhage         Recurrent cold sores         Spinal stenosis of lumbar region without neurogenic claudication         Visit for screening mammogram    Orders:    BI mammo bilateral screening tomosynthesis; Future    Asymptomatic menopausal state    Orders:    XR DEXA bone density; Future    Recurrent UTI         Atrophic vaginitis         Carpal tunnel syndrome of left wrist                   Patient was identified as a fall risk. Risk prevention instructions provided.  "

## 2025-02-17 NOTE — PATIENT INSTRUCTIONS
It was a pleasure to see you today.  I would like to remind you about importance of a healthy lifestyle in order to improve your well-being and live longer. Try to engage in physical activities for at least 150 minutes per week.  Eat about 10 servings of fruits and vegetables daily. My advice is 2 servings of fruits and 8 servings of vegetables. For vegetables choose at least half of them green and at least half of them fresh.  Please avoid sugar, salt, fried food and saturated fat.  Weight loss is advised. Target BMI: below 25. Please follow low carbohydrate diet and daily exercise routine for at least 30 minutes. Nutritional consultation is available, please let me know if you are interested. I will be happy to discuss details with you if interested.   Have a good day and stay well.      The ability to age comfortably depends on how you invest in your body.   Include physical activity in your daily routine. Physical activity increases blood flow to your whole body, including your brain. ...  Eat a healthy diet. A heart-healthy diet may benefit your brain.   Stay mentally active. Be social.   Treat cardiovascular disease.  No smoking, excessive EtOH intake or illicit drug use.         Ways to Help Prevent Falls at Home    Quick Tips   ? Ask for help if you need it. Most people want to help!   ? Get up slowly after sitting or laying down   ? Wear a medical alert device or keep cell phone in your pocket   ? Use night lights, especially areas near a bathroom   ? Keep the items you use often within reach on a small stool or end table   ? Use an assistive device such as walker or cane, as directed by provider/physical therapy   ? Use a non-slip mat and grab bars in your bathroom. Look for home health sections for best options     Other Areas to Focus On   ? Exercise and nutrition: Regular exercise or taking a falls prevention class are great ways improve strength and balance. Don’t forget to stay hydrated and bring a  snack!   ? Medicine side effects: Some medicines can make you sleepy or dizzy, which could cause a fall. Ask your healthcare provider about the side effects your medicines could cause. Be sure to let them know if you take any vitamins or supplements as well.   ? Tripping hazards: Remove items you could trip on, such as loose mats, rugs, cords, and clutter. Wear closed toe shoes with rubber soles.   ? Health and wellness: Get regular checkups with your healthcare provider, plus routine vision and hearing screenings. Talk with your healthcare provider about:   o Your medicines and the possible side effects - bring them in a bag if that is easier!   o Problems with balance or feeling dizzy   o Ways to promote bone health, such as Vitamin D and calcium supplements   o Questions or concerns about falling     *Ask your healthcare team if you have questions     ©[facility], 2022

## 2025-02-24 ENCOUNTER — TELEPHONE (OUTPATIENT)
Dept: PRIMARY CARE | Facility: CLINIC | Age: 85
End: 2025-02-24
Payer: MEDICARE

## 2025-02-24 DIAGNOSIS — N39.0 ACUTE UTI: Primary | ICD-10-CM

## 2025-02-24 RX ORDER — SULFAMETHOXAZOLE AND TRIMETHOPRIM 800; 160 MG/1; MG/1
1 TABLET ORAL 2 TIMES DAILY
Qty: 20 TABLET | Refills: 0 | Status: SHIPPED | OUTPATIENT
Start: 2025-02-24 | End: 2025-03-06

## 2025-02-24 NOTE — TELEPHONE ENCOUNTER
Pt states she has a UTI - symptoms burning/urgency/lose of control - has low dose at home antibiotic, took 2 today - but wants to know if Dr Azul can send in a script for higher dose antibiotic. Please advise

## 2025-03-18 DIAGNOSIS — E03.9 HYPOTHYROIDISM, UNSPECIFIED TYPE: ICD-10-CM

## 2025-03-18 LAB
NON-UH HIE A/G RATIO: 1
NON-UH HIE ALB: 3.6 G/DL (ref 3.4–5)
NON-UH HIE ALK PHOS: 64 UNIT/L (ref 45–117)
NON-UH HIE BILIRUBIN, TOTAL: 0.5 MG/DL (ref 0.3–1.2)
NON-UH HIE BUN/CREAT RATIO: 25
NON-UH HIE BUN: 20 MG/DL (ref 9–23)
NON-UH HIE CALCIUM: 9.6 MG/DL (ref 8.7–10.4)
NON-UH HIE CALCULATED LDL CHOLESTEROL: 126 MG/DL (ref 60–130)
NON-UH HIE CALCULATED OSMOLALITY: 281 MOSM/KG (ref 275–295)
NON-UH HIE CHLORIDE: 102 MMOL/L (ref 98–107)
NON-UH HIE CHOLESTEROL: 212 MG/DL (ref 100–200)
NON-UH HIE CO2, VENOUS: 31 MMOL/L (ref 20–31)
NON-UH HIE CREATININE: 0.8 MG/DL (ref 0.5–0.8)
NON-UH HIE FREE T4: 1.43 NG/DL (ref 0.89–1.76)
NON-UH HIE GFR AA: >60
NON-UH HIE GLOBULIN: 3.6 G/DL
NON-UH HIE GLOMERULAR FILTRATION RATE: >60 ML/MIN/1.73M?
NON-UH HIE GLUCOSE: 87 MG/DL (ref 74–106)
NON-UH HIE GOT: 20 UNIT/L (ref 15–37)
NON-UH HIE GPT: 20 UNIT/L (ref 10–49)
NON-UH HIE HCT: 38.8 % (ref 36–46)
NON-UH HIE HDL CHOLESTEROL: 73 MG/DL (ref 40–60)
NON-UH HIE HGB: 13.2 G/DL (ref 12–16)
NON-UH HIE INSTR WBC ND: 7.6
NON-UH HIE K: 4 MMOL/L (ref 3.5–5.1)
NON-UH HIE MCH: 31 PG (ref 27–34)
NON-UH HIE MCHC: 33.9 G/DL (ref 32–37)
NON-UH HIE MCV: 91.4 FL (ref 80–100)
NON-UH HIE MPV: 7.7 FL (ref 7.4–10.4)
NON-UH HIE NA: 140 MMOL/L (ref 135–145)
NON-UH HIE PLATELET: 235 X10 (ref 150–450)
NON-UH HIE RBC: 4.25 X10 (ref 4.2–5.4)
NON-UH HIE RDW: 13.6 % (ref 11.5–14.5)
NON-UH HIE TOTAL CHOL/HDL CHOL RATIO: 2.9
NON-UH HIE TOTAL PROTEIN: 7.1 G/DL (ref 5.7–8.2)
NON-UH HIE TRIGLYCERIDES: 64 MG/DL (ref 30–150)
NON-UH HIE TSH: 0.2 UIU/ML (ref 0.55–4.78)
NON-UH HIE WBC: 7.6 X10 (ref 4.5–11)

## 2025-03-18 RX ORDER — LEVOTHYROXINE SODIUM 112 UG/1
TABLET ORAL
Qty: 90 TABLET | Refills: 3 | Status: SHIPPED | OUTPATIENT
Start: 2025-03-18 | End: 2025-03-20 | Stop reason: DRUGHIGH

## 2025-03-18 NOTE — RESULT ENCOUNTER NOTE
Fortino Mckinney-  Even thought the LDL is mildly elevated the HDL is great and would not treat   The thyroid is a little on the HYPER side so lets just take 1/2 pill on Sunday and still take 1 pill rest of the week   Rest of the labs are good range

## 2025-03-20 RX ORDER — LEVOTHYROXINE SODIUM 112 UG/1
TABLET ORAL
Qty: 90 TABLET | Refills: 3 | Status: SHIPPED | OUTPATIENT
Start: 2025-03-20

## 2025-03-24 DIAGNOSIS — G62.9 NEUROPATHY: ICD-10-CM

## 2025-03-24 RX ORDER — GABAPENTIN 300 MG/1
300 CAPSULE ORAL 3 TIMES DAILY
Qty: 270 CAPSULE | Refills: 0 | Status: SHIPPED | OUTPATIENT
Start: 2025-03-24 | End: 2025-06-22

## 2025-03-24 NOTE — TELEPHONE ENCOUNTER
Seen 2/17/25, you agreed to take over this prescription when she was in last time, pain management used to call it in.

## 2025-04-07 ENCOUNTER — TELEPHONE (OUTPATIENT)
Dept: PRIMARY CARE | Facility: CLINIC | Age: 85
End: 2025-04-07
Payer: MEDICARE

## 2025-04-07 DIAGNOSIS — N39.0 URINARY TRACT INFECTION WITHOUT HEMATURIA, SITE UNSPECIFIED: Primary | ICD-10-CM

## 2025-04-07 NOTE — TELEPHONE ENCOUNTER
Pt gets chronic UTI's  - already takes low dose antibiotic - states has a new one again - incontinent, extreme burning - wants to know if a stronger antibiotic can be sent in - please advise

## 2025-04-07 NOTE — TELEPHONE ENCOUNTER
"Called patient offered her 2- 3 different appointments patient declined/unavailable . Patient advised she would \"self medicate\" by taking additional antibiotics that she is prescribed as a maintenance dose    "

## 2025-04-07 NOTE — TELEPHONE ENCOUNTER
She will  go to the lab in a couple of days, she is doing a bus tour and can not go now, but she is uisng her maintenance dose as a treatment dose. She will make an appointment next week.

## 2025-04-20 DIAGNOSIS — E03.9 HYPOTHYROIDISM, UNSPECIFIED TYPE: ICD-10-CM

## 2025-04-20 RX ORDER — LEVOTHYROXINE SODIUM 112 UG/1
112 TABLET ORAL DAILY
Qty: 90 TABLET | Refills: 3 | Status: SHIPPED | OUTPATIENT
Start: 2025-04-20

## 2025-04-24 ENCOUNTER — APPOINTMENT (OUTPATIENT)
Dept: PAIN MEDICINE | Facility: CLINIC | Age: 85
End: 2025-04-24
Payer: MEDICARE

## 2025-05-17 DIAGNOSIS — N39.0 ACUTE UTI: Primary | ICD-10-CM

## 2025-05-17 RX ORDER — SULFAMETHOXAZOLE AND TRIMETHOPRIM 800; 160 MG/1; MG/1
1 TABLET ORAL 2 TIMES DAILY
Qty: 20 TABLET | Refills: 0 | Status: SHIPPED | OUTPATIENT
Start: 2025-05-17 | End: 2025-05-27

## 2025-06-14 DIAGNOSIS — N39.0 RECURRENT UTI: ICD-10-CM

## 2025-06-14 RX ORDER — NITROFURANTOIN MACROCRYSTALS 50 MG/1
50 CAPSULE ORAL NIGHTLY
Qty: 90 CAPSULE | Refills: 2 | Status: SHIPPED | OUTPATIENT
Start: 2025-06-14

## 2025-08-07 DIAGNOSIS — G62.9 NEUROPATHY: ICD-10-CM

## 2025-08-07 RX ORDER — GABAPENTIN 300 MG/1
300 CAPSULE ORAL 3 TIMES DAILY
Qty: 270 CAPSULE | Refills: 0 | Status: SHIPPED | OUTPATIENT
Start: 2025-08-07

## 2025-08-18 ENCOUNTER — APPOINTMENT (OUTPATIENT)
Dept: PRIMARY CARE | Facility: CLINIC | Age: 85
End: 2025-08-18
Payer: MEDICARE

## 2025-08-18 VITALS
HEART RATE: 70 BPM | WEIGHT: 146 LBS | BODY MASS INDEX: 29.43 KG/M2 | HEIGHT: 59 IN | OXYGEN SATURATION: 97 % | SYSTOLIC BLOOD PRESSURE: 136 MMHG | DIASTOLIC BLOOD PRESSURE: 70 MMHG

## 2025-08-18 DIAGNOSIS — I10 PRIMARY HYPERTENSION: Primary | ICD-10-CM

## 2025-08-18 DIAGNOSIS — G62.9 NEUROPATHY: ICD-10-CM

## 2025-08-18 DIAGNOSIS — M48.061 SPINAL STENOSIS OF LUMBAR REGION WITHOUT NEUROGENIC CLAUDICATION: ICD-10-CM

## 2025-08-18 DIAGNOSIS — K21.00 GASTROESOPHAGEAL REFLUX DISEASE WITH ESOPHAGITIS WITHOUT HEMORRHAGE: ICD-10-CM

## 2025-08-18 DIAGNOSIS — E03.9 HYPOTHYROIDISM, UNSPECIFIED TYPE: ICD-10-CM

## 2025-08-18 DIAGNOSIS — Z96.642 STATUS POST LEFT HIP REPLACEMENT: ICD-10-CM

## 2025-08-18 DIAGNOSIS — N39.0 RECURRENT UTI: ICD-10-CM

## 2025-08-18 PROCEDURE — 3078F DIAST BP <80 MM HG: CPT | Performed by: INTERNAL MEDICINE

## 2025-08-18 PROCEDURE — 99214 OFFICE O/P EST MOD 30 MIN: CPT | Performed by: INTERNAL MEDICINE

## 2025-08-18 PROCEDURE — 3075F SYST BP GE 130 - 139MM HG: CPT | Performed by: INTERNAL MEDICINE

## 2025-08-18 PROCEDURE — G2211 COMPLEX E/M VISIT ADD ON: HCPCS | Performed by: INTERNAL MEDICINE

## 2025-08-18 PROCEDURE — 1160F RVW MEDS BY RX/DR IN RCRD: CPT | Performed by: INTERNAL MEDICINE

## 2025-08-18 PROCEDURE — 1159F MED LIST DOCD IN RCRD: CPT | Performed by: INTERNAL MEDICINE

## 2025-08-18 RX ORDER — GABAPENTIN 300 MG/1
300 CAPSULE ORAL 2 TIMES DAILY
Qty: 270 CAPSULE | Refills: 0 | Status: SHIPPED | OUTPATIENT
Start: 2025-08-18

## 2025-08-18 RX ORDER — FLUOCINOLONE ACETONIDE 0.11 MG/ML
1 OIL TOPICAL DAILY
COMMUNITY
Start: 2025-06-23

## 2026-02-18 ENCOUNTER — APPOINTMENT (OUTPATIENT)
Dept: PRIMARY CARE | Facility: CLINIC | Age: 86
End: 2026-02-18
Payer: MEDICARE

## (undated) DEVICE — SUTURE, CHROMIC GUT 5/0  18  P-13"

## (undated) DEVICE — PREP TRAY, SKIN, DRY, W/12 SPONGES, W/GLOVES

## (undated) DEVICE — DRAPE, SHEET, HAND, GUSSETTED, W/TABLE EXTENSION, 77 X 146 IN, DISPOSABLE, LF, STERILE

## (undated) DEVICE — BANDAGE, ELASTIC, ACE, SELF-CLOSURE, 3 IN X 5 YD, NONSTERILE

## (undated) DEVICE — PACK, BASIC

## (undated) DEVICE — SUTURE, VICRYL, 4-0, 18 IN, P-3, UNDYED

## (undated) DEVICE — DRESSING, GAUZE, PETROLATUM, STRIP, XEROFORM, 1 X 8 IN, STERILE

## (undated) DEVICE — BANDAGE, ESMARK 4 IN X 9 FT, STERILE

## (undated) DEVICE — PADDING, WEBRIL, UNDERCAST, STERILE, 4 IN

## (undated) DEVICE — DRESSING, GAUZE, SPONGE, 12 PLY, CURITY, 4 X 4 IN, STERILE

## (undated) DEVICE — GLOVE, SURGICAL, PROTEXIS PI , 7.0, PF, LF

## (undated) DEVICE — CUFF, TOURNIQUET, 18 X 4, SNGL PORT/SNGL BLADDER, DISP, LF

## (undated) DEVICE — CORD, CAUTERY, BIOPOLAR FORCEP, 12FT

## (undated) DEVICE — SUTURE, MONOCRYLIC, 4-0, P3, MONO 18

## (undated) DEVICE — STOCKINETTE, DOUBLE PLY, 4 X 48 IN, STERILE

## (undated) DEVICE — GLOVE, SURGICAL, PROTEXIS PI , 7.5, PF, LF